# Patient Record
Sex: MALE | Race: WHITE | NOT HISPANIC OR LATINO | Employment: OTHER | URBAN - METROPOLITAN AREA
[De-identification: names, ages, dates, MRNs, and addresses within clinical notes are randomized per-mention and may not be internally consistent; named-entity substitution may affect disease eponyms.]

---

## 2021-01-04 ENCOUNTER — CLINICAL SUPPORT (OUTPATIENT)
Dept: URGENT CARE | Facility: CLINIC | Age: 71
End: 2021-01-04
Payer: MEDICARE

## 2021-01-04 DIAGNOSIS — Z11.9 ENCOUNTER FOR SCREENING EXAMINATION FOR INFECTIOUS DISEASE: Primary | ICD-10-CM

## 2021-01-04 LAB
CTP QC/QA: YES
SARS-COV-2 RDRP RESP QL NAA+PROBE: NEGATIVE

## 2021-01-04 PROCEDURE — 99211 PR OFFICE/OUTPT VISIT, EST, LEVL I: ICD-10-PCS | Mod: S$GLB,,, | Performed by: FAMILY MEDICINE

## 2021-01-04 PROCEDURE — U0002 COVID-19 LAB TEST NON-CDC: HCPCS | Mod: QW,CR,S$GLB, | Performed by: FAMILY MEDICINE

## 2021-01-04 PROCEDURE — 99211 OFF/OP EST MAY X REQ PHY/QHP: CPT | Mod: S$GLB,,, | Performed by: FAMILY MEDICINE

## 2021-01-04 PROCEDURE — U0002: ICD-10-PCS | Mod: QW,CR,S$GLB, | Performed by: FAMILY MEDICINE

## 2021-01-12 ENCOUNTER — IMMUNIZATION (OUTPATIENT)
Dept: INTERNAL MEDICINE | Facility: CLINIC | Age: 71
End: 2021-01-12
Payer: MEDICARE

## 2021-01-12 DIAGNOSIS — Z23 NEED FOR VACCINATION: ICD-10-CM

## 2021-01-12 PROCEDURE — 91300 COVID-19, MRNA, LNP-S, PF, 30 MCG/0.3 ML DOSE VACCINE: CPT | Mod: PBBFAC | Performed by: INTERNAL MEDICINE

## 2021-02-02 ENCOUNTER — IMMUNIZATION (OUTPATIENT)
Dept: INTERNAL MEDICINE | Facility: CLINIC | Age: 71
End: 2021-02-02
Payer: MEDICARE

## 2021-02-02 DIAGNOSIS — Z23 NEED FOR VACCINATION: Primary | ICD-10-CM

## 2021-02-02 PROCEDURE — 91300 COVID-19, MRNA, LNP-S, PF, 30 MCG/0.3 ML DOSE VACCINE: CPT | Mod: PBBFAC | Performed by: INTERNAL MEDICINE

## 2021-02-02 PROCEDURE — 0002A COVID-19, MRNA, LNP-S, PF, 30 MCG/0.3 ML DOSE VACCINE: CPT | Mod: PBBFAC | Performed by: INTERNAL MEDICINE

## 2021-03-18 ENCOUNTER — PATIENT MESSAGE (OUTPATIENT)
Dept: RESEARCH | Facility: HOSPITAL | Age: 71
End: 2021-03-18

## 2021-03-26 ENCOUNTER — PATIENT MESSAGE (OUTPATIENT)
Dept: RESEARCH | Facility: HOSPITAL | Age: 71
End: 2021-03-26

## 2021-03-26 ENCOUNTER — HOSPITAL ENCOUNTER (EMERGENCY)
Facility: HOSPITAL | Age: 71
Discharge: HOME | End: 2021-03-26
Attending: EMERGENCY MEDICINE
Payer: MEDICARE

## 2021-03-26 ENCOUNTER — OFFICE VISIT (OUTPATIENT)
Dept: URGENT CARE | Facility: CLINIC | Age: 71
End: 2021-03-26
Payer: MEDICARE

## 2021-03-26 VITALS
HEART RATE: 69 BPM | BODY MASS INDEX: 28.04 KG/M2 | HEIGHT: 68 IN | SYSTOLIC BLOOD PRESSURE: 124 MMHG | TEMPERATURE: 98 F | WEIGHT: 185 LBS | DIASTOLIC BLOOD PRESSURE: 62 MMHG | RESPIRATION RATE: 18 BRPM | OXYGEN SATURATION: 99 %

## 2021-03-26 VITALS
TEMPERATURE: 98 F | DIASTOLIC BLOOD PRESSURE: 63 MMHG | BODY MASS INDEX: 27.4 KG/M2 | HEART RATE: 68 BPM | HEIGHT: 69 IN | OXYGEN SATURATION: 97 % | WEIGHT: 185 LBS | RESPIRATION RATE: 13 BRPM | SYSTOLIC BLOOD PRESSURE: 96 MMHG

## 2021-03-26 DIAGNOSIS — R52 BODY ACHES: ICD-10-CM

## 2021-03-26 DIAGNOSIS — R53.83 FATIGUE, UNSPECIFIED TYPE: ICD-10-CM

## 2021-03-26 DIAGNOSIS — R39.15 URGENCY OF URINATION: ICD-10-CM

## 2021-03-26 DIAGNOSIS — N39.0 COMPLICATED UTI (URINARY TRACT INFECTION): Primary | ICD-10-CM

## 2021-03-26 DIAGNOSIS — R68.83 CHILLS: ICD-10-CM

## 2021-03-26 DIAGNOSIS — R06.02 SOB (SHORTNESS OF BREATH): Primary | ICD-10-CM

## 2021-03-26 PROBLEM — M16.12 OSTEOARTHRITIS OF LEFT HIP: Status: ACTIVE | Noted: 2020-05-12

## 2021-03-26 PROBLEM — G47.33 OBSTRUCTIVE SLEEP APNEA SYNDROME: Status: ACTIVE | Noted: 2021-03-26

## 2021-03-26 PROBLEM — I10 HYPERTENSION: Status: ACTIVE | Noted: 2021-03-26

## 2021-03-26 PROBLEM — Z78.9 STATIN INTOLERANCE: Status: ACTIVE | Noted: 2021-03-26

## 2021-03-26 PROBLEM — M48.062 LUMBAR STENOSIS WITH NEUROGENIC CLAUDICATION: Status: ACTIVE | Noted: 2019-09-09

## 2021-03-26 PROBLEM — I65.29 CAROTID ATHEROSCLEROSIS: Status: ACTIVE | Noted: 2021-03-26

## 2021-03-26 PROBLEM — I35.0 AORTIC VALVE STENOSIS: Status: ACTIVE | Noted: 2021-03-26

## 2021-03-26 PROBLEM — Z98.1 HX OF FUSION OF CERVICAL SPINE: Status: ACTIVE | Noted: 2021-03-26

## 2021-03-26 PROBLEM — R74.8 ELEVATED CREATINE KINASE LEVEL: Status: ACTIVE | Noted: 2021-03-26

## 2021-03-26 PROBLEM — I48.3 TYPICAL ATRIAL FLUTTER: Status: ACTIVE | Noted: 2021-03-26

## 2021-03-26 PROBLEM — E78.5 HYPERLIPIDEMIA: Status: ACTIVE | Noted: 2021-03-26

## 2021-03-26 PROBLEM — M10.9 GOUT: Status: ACTIVE | Noted: 2021-03-26

## 2021-03-26 PROBLEM — Z82.49 FAMILY HISTORY OF CORONARY ARTERY DISEASE: Status: ACTIVE | Noted: 2021-03-26

## 2021-03-26 PROBLEM — I47.29 NONSUSTAINED VENTRICULAR TACHYCARDIA: Status: ACTIVE | Noted: 2021-03-26

## 2021-03-26 LAB
ALBUMIN SERPL BCP-MCNC: 3.5 G/DL (ref 3.5–5.2)
ALP SERPL-CCNC: 91 U/L (ref 55–135)
ALT SERPL W/O P-5'-P-CCNC: 27 U/L (ref 10–44)
ANION GAP SERPL CALC-SCNC: 10 MMOL/L (ref 8–16)
AST SERPL-CCNC: 30 U/L (ref 10–40)
BACTERIA #/AREA URNS AUTO: ABNORMAL /HPF
BASOPHILS # BLD AUTO: 0.01 K/UL (ref 0–0.2)
BASOPHILS NFR BLD: 0.1 % (ref 0–1.9)
BILIRUB SERPL-MCNC: 0.9 MG/DL (ref 0.1–1)
BILIRUB UR QL STRIP: NEGATIVE
BILIRUB UR QL STRIP: NEGATIVE
BUN SERPL-MCNC: 21 MG/DL (ref 6–30)
BUN SERPL-MCNC: 22 MG/DL (ref 8–23)
CALCIUM SERPL-MCNC: 8.7 MG/DL (ref 8.7–10.5)
CHLORIDE SERPL-SCNC: 94 MMOL/L (ref 95–110)
CHLORIDE SERPL-SCNC: 97 MMOL/L (ref 95–110)
CLARITY UR REFRACT.AUTO: ABNORMAL
CO2 SERPL-SCNC: 26 MMOL/L (ref 23–29)
COLOR UR AUTO: YELLOW
CREAT SERPL-MCNC: 0.9 MG/DL (ref 0.5–1.4)
CREAT SERPL-MCNC: 0.9 MG/DL (ref 0.5–1.4)
CTP QC/QA: YES
CTP QC/QA: YES
DIFFERENTIAL METHOD: ABNORMAL
EOSINOPHIL # BLD AUTO: 0.1 K/UL (ref 0–0.5)
EOSINOPHIL NFR BLD: 1 % (ref 0–8)
ERYTHROCYTE [DISTWIDTH] IN BLOOD BY AUTOMATED COUNT: 12.9 % (ref 11.5–14.5)
EST. GFR  (AFRICAN AMERICAN): >60 ML/MIN/1.73 M^2
EST. GFR  (NON AFRICAN AMERICAN): >60 ML/MIN/1.73 M^2
GLUCOSE SERPL-MCNC: 105 MG/DL (ref 70–110)
GLUCOSE SERPL-MCNC: 106 MG/DL (ref 70–110)
GLUCOSE UR QL STRIP: NEGATIVE
GLUCOSE UR QL STRIP: NEGATIVE
HCT VFR BLD AUTO: 35 % (ref 40–54)
HCT VFR BLD CALC: 35 %PCV (ref 36–54)
HCV AB SERPL QL IA: NEGATIVE
HGB BLD-MCNC: 12 G/DL (ref 14–18)
HGB UR QL STRIP: NEGATIVE
HYALINE CASTS UR QL AUTO: 2 /LPF
IMM GRANULOCYTES # BLD AUTO: 0.02 K/UL (ref 0–0.04)
IMM GRANULOCYTES NFR BLD AUTO: 0.3 % (ref 0–0.5)
KETONES UR QL STRIP: NEGATIVE
KETONES UR QL STRIP: NEGATIVE
LEUKOCYTE ESTERASE UR QL STRIP: ABNORMAL
LEUKOCYTE ESTERASE UR QL STRIP: POSITIVE
LYMPHOCYTES # BLD AUTO: 0.4 K/UL (ref 1–4.8)
LYMPHOCYTES NFR BLD: 5.8 % (ref 18–48)
MCH RBC QN AUTO: 29.7 PG (ref 27–31)
MCHC RBC AUTO-ENTMCNC: 34.3 G/DL (ref 32–36)
MCV RBC AUTO: 87 FL (ref 82–98)
MICROSCOPIC COMMENT: ABNORMAL
MONOCYTES # BLD AUTO: 0.6 K/UL (ref 0.3–1)
MONOCYTES NFR BLD: 8.7 % (ref 4–15)
NEUTROPHILS # BLD AUTO: 5.8 K/UL (ref 1.8–7.7)
NEUTROPHILS NFR BLD: 84.1 % (ref 38–73)
NITRITE UR QL STRIP: NEGATIVE
NON-SQ EPI CELLS #/AREA URNS AUTO: 4 /HPF
NRBC BLD-RTO: 0 /100 WBC
PH UR STRIP: 6 [PH] (ref 5–8)
PH, POC UA: 5 (ref 5–8)
PLATELET # BLD AUTO: 120 K/UL (ref 150–350)
PMV BLD AUTO: 9.8 FL (ref 9.2–12.9)
POC BLOOD, URINE: NEGATIVE
POC IONIZED CALCIUM: 1.09 MMOL/L (ref 1.06–1.42)
POC MOLECULAR INFLUENZA A AGN: NEGATIVE
POC MOLECULAR INFLUENZA B AGN: NEGATIVE
POC NITRATES, URINE: NEGATIVE
POC TCO2 (MEASURED): 27 MMOL/L (ref 23–29)
POTASSIUM BLD-SCNC: 3.6 MMOL/L (ref 3.5–5.1)
POTASSIUM SERPL-SCNC: 3.7 MMOL/L (ref 3.5–5.1)
PROT SERPL-MCNC: 7 G/DL (ref 6–8.4)
PROT UR QL STRIP: ABNORMAL
PROT UR QL STRIP: NEGATIVE
RBC # BLD AUTO: 4.04 M/UL (ref 4.6–6.2)
RBC #/AREA URNS AUTO: 13 /HPF (ref 0–4)
SAMPLE: ABNORMAL
SARS-COV-2 RDRP RESP QL NAA+PROBE: NEGATIVE
SODIUM BLD-SCNC: 133 MMOL/L (ref 136–145)
SODIUM SERPL-SCNC: 133 MMOL/L (ref 136–145)
SP GR UR STRIP: 1.01 (ref 1–1.03)
SP GR UR STRIP: 1.02 (ref 1–1.03)
URN SPEC COLLECT METH UR: ABNORMAL
UROBILINOGEN UR STRIP-ACNC: NORMAL (ref 0.3–2.2)
WBC # BLD AUTO: 6.93 K/UL (ref 3.9–12.7)
WBC #/AREA URNS AUTO: >100 /HPF (ref 0–5)

## 2021-03-26 PROCEDURE — 81003 URINALYSIS AUTO W/O SCOPE: CPT | Mod: QW,S$GLB,, | Performed by: PHYSICIAN ASSISTANT

## 2021-03-26 PROCEDURE — 99284 EMERGENCY DEPT VISIT MOD MDM: CPT | Mod: 25

## 2021-03-26 PROCEDURE — 87502 INFLUENZA DNA AMP PROBE: CPT | Mod: QW,S$GLB,, | Performed by: PHYSICIAN ASSISTANT

## 2021-03-26 PROCEDURE — 87077 CULTURE AEROBIC IDENTIFY: CPT | Performed by: EMERGENCY MEDICINE

## 2021-03-26 PROCEDURE — 87502 POCT INFLUENZA A/B MOLECULAR: ICD-10-PCS | Mod: QW,S$GLB,, | Performed by: PHYSICIAN ASSISTANT

## 2021-03-26 PROCEDURE — 99214 PR OFFICE/OUTPT VISIT, EST, LEVL IV, 30-39 MIN: ICD-10-PCS | Mod: 25,S$GLB,, | Performed by: PHYSICIAN ASSISTANT

## 2021-03-26 PROCEDURE — 71046 XR CHEST PA AND LATERAL: ICD-10-PCS | Mod: S$GLB,,, | Performed by: RADIOLOGY

## 2021-03-26 PROCEDURE — 81001 URINALYSIS AUTO W/SCOPE: CPT | Performed by: EMERGENCY MEDICINE

## 2021-03-26 PROCEDURE — 86803 HEPATITIS C AB TEST: CPT | Performed by: EMERGENCY MEDICINE

## 2021-03-26 PROCEDURE — 99284 PR EMERGENCY DEPT VISIT,LEVEL IV: ICD-10-PCS | Mod: ,,, | Performed by: EMERGENCY MEDICINE

## 2021-03-26 PROCEDURE — 63600175 PHARM REV CODE 636 W HCPCS: Performed by: EMERGENCY MEDICINE

## 2021-03-26 PROCEDURE — 85025 COMPLETE CBC W/AUTO DIFF WBC: CPT | Performed by: EMERGENCY MEDICINE

## 2021-03-26 PROCEDURE — 99284 EMERGENCY DEPT VISIT MOD MDM: CPT | Mod: ,,, | Performed by: EMERGENCY MEDICINE

## 2021-03-26 PROCEDURE — 87088 URINE BACTERIA CULTURE: CPT | Performed by: EMERGENCY MEDICINE

## 2021-03-26 PROCEDURE — 87186 SC STD MICRODIL/AGAR DIL: CPT | Performed by: EMERGENCY MEDICINE

## 2021-03-26 PROCEDURE — 81003 POCT URINALYSIS, DIPSTICK, AUTOMATED, W/O SCOPE: ICD-10-PCS | Mod: QW,S$GLB,, | Performed by: PHYSICIAN ASSISTANT

## 2021-03-26 PROCEDURE — U0002 COVID-19 LAB TEST NON-CDC: HCPCS | Mod: QW,CR,S$GLB, | Performed by: PHYSICIAN ASSISTANT

## 2021-03-26 PROCEDURE — 87086 URINE CULTURE/COLONY COUNT: CPT | Performed by: EMERGENCY MEDICINE

## 2021-03-26 PROCEDURE — 82330 ASSAY OF CALCIUM: CPT

## 2021-03-26 PROCEDURE — 80053 COMPREHEN METABOLIC PANEL: CPT | Performed by: EMERGENCY MEDICINE

## 2021-03-26 PROCEDURE — 96374 THER/PROPH/DIAG INJ IV PUSH: CPT

## 2021-03-26 PROCEDURE — 71046 X-RAY EXAM CHEST 2 VIEWS: CPT | Mod: S$GLB,,, | Performed by: RADIOLOGY

## 2021-03-26 PROCEDURE — U0002: ICD-10-PCS | Mod: QW,CR,S$GLB, | Performed by: PHYSICIAN ASSISTANT

## 2021-03-26 PROCEDURE — 80047 BASIC METABLC PNL IONIZED CA: CPT

## 2021-03-26 PROCEDURE — 99214 OFFICE O/P EST MOD 30 MIN: CPT | Mod: 25,S$GLB,, | Performed by: PHYSICIAN ASSISTANT

## 2021-03-26 RX ORDER — TERAZOSIN 5 MG/1
5 CAPSULE ORAL NIGHTLY
COMMUNITY

## 2021-03-26 RX ORDER — ALFUZOSIN HYDROCHLORIDE 10 MG/1
TABLET, EXTENDED RELEASE ORAL
COMMUNITY
Start: 2021-03-10

## 2021-03-26 RX ORDER — NEBIVOLOL 5 MG/1
10 TABLET ORAL DAILY
COMMUNITY

## 2021-03-26 RX ORDER — CEFTRIAXONE 1 G/1
1 INJECTION, POWDER, FOR SOLUTION INTRAMUSCULAR; INTRAVENOUS
Status: COMPLETED | OUTPATIENT
Start: 2021-03-26 | End: 2021-03-26

## 2021-03-26 RX ORDER — CEFDINIR 300 MG/1
300 CAPSULE ORAL 2 TIMES DAILY
Qty: 28 CAPSULE | Refills: 0 | Status: SHIPPED | OUTPATIENT
Start: 2021-03-26 | End: 2021-04-09

## 2021-03-26 RX ORDER — LOSARTAN POTASSIUM 25 MG/1
TABLET ORAL
COMMUNITY

## 2021-03-26 RX ORDER — AMLODIPINE BESYLATE 10 MG/1
10 TABLET ORAL
COMMUNITY
Start: 2019-09-30

## 2021-03-26 RX ADMIN — CEFTRIAXONE 1 G: 1 INJECTION, POWDER, FOR SOLUTION INTRAMUSCULAR; INTRAVENOUS at 02:03

## 2021-03-29 LAB — BACTERIA UR CULT: ABNORMAL

## 2022-02-22 ENCOUNTER — TELEPHONE (OUTPATIENT)
Dept: NEUROLOGY | Facility: CLINIC | Age: 72
End: 2022-02-22
Payer: MEDICARE

## 2022-02-22 NOTE — TELEPHONE ENCOUNTER
----- Message from Liliam Diaz sent at 2/22/2022 12:43 PM CST -----  Regarding: schedule  Contact: [phone]  Request Appt      Appt Type: Movement   Call Back Number: [phone]  Additional Information:

## 2022-02-23 ENCOUNTER — TELEPHONE (OUTPATIENT)
Dept: NEUROLOGY | Facility: CLINIC | Age: 72
End: 2022-02-23
Payer: MEDICARE

## 2022-02-23 NOTE — TELEPHONE ENCOUNTER
----- Message from Rashida Mathias sent at 2/23/2022 10:39 AM CST -----  Regarding: scheduling  Contact: pt daughter  Pt daughter requesting a call back in regards to scheduling an appt.    Pt angela Flaherty @ [phone]

## 2022-02-25 ENCOUNTER — TELEPHONE (OUTPATIENT)
Dept: NEUROLOGY | Facility: CLINIC | Age: 72
End: 2022-02-25
Payer: MEDICARE

## 2022-02-25 NOTE — TELEPHONE ENCOUNTER
----- Message from Anne Talbert sent at 2/25/2022  2:11 PM CST -----  Regarding: appt  Contact: pt @ [phone]  Pt calling to schedule an appt with Dr. Ahuja, patient was recently diagnosed with Parkinson's while in [place]. Please call to schedule.

## 2022-02-25 NOTE — TELEPHONE ENCOUNTER
Spoke to patient informed him the appointment will be scheduled in June when the schedule opens. Will place appointment on wait list.

## 2022-03-10 ENCOUNTER — TELEPHONE (OUTPATIENT)
Dept: NEUROLOGY | Facility: CLINIC | Age: 72
End: 2022-03-10
Payer: MEDICARE

## 2022-03-10 NOTE — TELEPHONE ENCOUNTER
----- Message from Jeb Mckeon sent at 3/10/2022 11:44 AM CST -----  Patient called to speak w/ someone in regards to scheduling a NP appt for parkinson's related treatment. Requesting callback [phone]

## 2022-04-19 ENCOUNTER — TELEPHONE (OUTPATIENT)
Dept: NEUROLOGY | Facility: CLINIC | Age: 72
End: 2022-04-19
Payer: MEDICARE

## 2022-04-19 NOTE — TELEPHONE ENCOUNTER
----- Message from Matilde Black sent at 4/19/2022  5:20 PM CDT -----  Needs advice from nurse:      Who Called:pt  Regarding:returning a call  Would the patient rather a call back or VIA [facility]?  Best Call Back number:[phone]  Additional Info:

## 2022-04-19 NOTE — TELEPHONE ENCOUNTER
Called to inform patient of the canceling of the virtual appointment as it was scheduled incorrectly. Patient needs to be seen in person. No answer voicemail left.

## 2022-04-20 ENCOUNTER — TELEPHONE (OUTPATIENT)
Dept: NEUROLOGY | Facility: CLINIC | Age: 72
End: 2022-04-20
Payer: MEDICARE

## 2022-04-20 NOTE — TELEPHONE ENCOUNTER
Spoke to patient informed him I will be in touch when the decision is made for in person or virtual.

## 2022-04-20 NOTE — TELEPHONE ENCOUNTER
----- Message from Bety Richey sent at 4/20/2022  4:19 PM CDT -----  Regarding: Appointment  Contact: [phone]  Calling to rescheduled appointment on 5/5/22 to a in person visit. Please call as patient has called previously with no response.

## 2022-04-25 ENCOUNTER — TELEPHONE (OUTPATIENT)
Dept: NEUROLOGY | Facility: CLINIC | Age: 72
End: 2022-04-25

## 2022-04-26 ENCOUNTER — TELEPHONE (OUTPATIENT)
Dept: NEUROLOGY | Facility: CLINIC | Age: 72
End: 2022-04-26
Payer: MEDICARE

## 2022-04-26 NOTE — TELEPHONE ENCOUNTER
----- Message from Anai Burgos sent at 4/26/2022 12:49 PM CDT -----  Contact: pt  Pt requesting call back , needs to schedule appt with Dr on either 5-3, 4, or 6 had a virtual but Dr wanted to see him in person . Please call         Confirmed patient's contact info below:  Contact Name: Ulisses Ponce  Phone Number: [phone]

## 2022-04-26 NOTE — TELEPHONE ENCOUNTER
----- Message from Carmen Sultana sent at 4/26/2022  4:24 PM CDT -----  Regarding: pt called  Name of Who is Calling:GUI SNOW [[id number]]       What is the request in detail: pt is requesting  a call back from the office to set up his appt . Please advise      Can the clinic reply by [facility]: No      What Number to Call Back if not in [facility]: [phone] or [phone]

## 2022-04-26 NOTE — TELEPHONE ENCOUNTER
----- Message from Miguelangel Navarro sent at 4/26/2022  4:02 PM CDT -----  Who Called: GUI SNOW     What is the request in detail: Pt called in to reschedule appt. Please advise.     Can the clinic reply by [facility]?    Best Call Back Number: [phone]    Additional Information:

## 2022-04-26 NOTE — TELEPHONE ENCOUNTER
----- Message from Tanisha Dahl sent at 4/26/2022  2:37 PM CDT -----  Regarding: Appt Request  Pt called about scheduling an appt pt states he has left several messages about r/s.     Requesting Call back: [phone] or [phone]

## 2022-04-27 NOTE — TELEPHONE ENCOUNTER
Left detailed message for patient offering new patient appt with ARIANNA on 6/29/22. Or RR on 5/16/22.

## 2022-04-28 ENCOUNTER — TELEPHONE (OUTPATIENT)
Dept: NEUROLOGY | Facility: CLINIC | Age: 72
End: 2022-04-28
Payer: MEDICARE

## 2022-04-28 NOTE — TELEPHONE ENCOUNTER
----- Message from Sharmin Capone sent at 4/28/2022  9:50 AM CDT -----  Regarding: appt  Contact: pt @ [phone]  Pt is calling to get appt, Kalee stated that she was calling pt back, pt stated the she still have not call to give him appt. Asking for a call back please

## 2022-04-28 NOTE — TELEPHONE ENCOUNTER
New patient appt scheduled with [facility] 06/29/22 at 3:40 PM. Confirmed time and location with patient. Mailing appt slip.

## 2022-06-29 ENCOUNTER — LAB VISIT (OUTPATIENT)
Dept: LAB | Facility: HOSPITAL | Age: 72
End: 2022-06-29
Payer: MEDICARE

## 2022-06-29 ENCOUNTER — OFFICE VISIT (OUTPATIENT)
Dept: NEUROLOGY | Facility: CLINIC | Age: 72
End: 2022-06-29
Payer: MEDICARE

## 2022-06-29 VITALS
BODY MASS INDEX: 26.29 KG/M2 | WEIGHT: 177.5 LBS | SYSTOLIC BLOOD PRESSURE: 106 MMHG | HEIGHT: 69 IN | HEART RATE: 69 BPM | DIASTOLIC BLOOD PRESSURE: 67 MMHG

## 2022-06-29 DIAGNOSIS — Z71.89 COUNSELING REGARDING GOALS OF CARE: ICD-10-CM

## 2022-06-29 DIAGNOSIS — R41.3 MEMORY CHANGE: ICD-10-CM

## 2022-06-29 DIAGNOSIS — G60.9 HEREDITARY AND IDIOPATHIC NEUROPATHY, UNSPECIFIED: ICD-10-CM

## 2022-06-29 DIAGNOSIS — G20.A1 PD (PARKINSON'S DISEASE): Primary | ICD-10-CM

## 2022-06-29 DIAGNOSIS — K59.00 CONSTIPATION, UNSPECIFIED CONSTIPATION TYPE: ICD-10-CM

## 2022-06-29 DIAGNOSIS — G62.9 PERIPHERAL POLYNEUROPATHY: ICD-10-CM

## 2022-06-29 PROCEDURE — 99999 PR PBB SHADOW E&M-EST. PATIENT-LVL III: ICD-10-PCS | Mod: PBBFAC,,, | Performed by: PSYCHIATRY & NEUROLOGY

## 2022-06-29 PROCEDURE — 99205 PR OFFICE/OUTPT VISIT, NEW, LEVL V, 60-74 MIN: ICD-10-PCS | Mod: S$PBB,,, | Performed by: PSYCHIATRY & NEUROLOGY

## 2022-06-29 PROCEDURE — 36415 COLL VENOUS BLD VENIPUNCTURE: CPT | Performed by: PHYSICIAN ASSISTANT

## 2022-06-29 PROCEDURE — 84207 ASSAY OF VITAMIN B-6: CPT | Performed by: PHYSICIAN ASSISTANT

## 2022-06-29 PROCEDURE — 99999 PR PBB SHADOW E&M-EST. PATIENT-LVL III: CPT | Mod: PBBFAC,,, | Performed by: PSYCHIATRY & NEUROLOGY

## 2022-06-29 PROCEDURE — 86592 SYPHILIS TEST NON-TREP QUAL: CPT | Performed by: PHYSICIAN ASSISTANT

## 2022-06-29 PROCEDURE — 99205 OFFICE O/P NEW HI 60 MIN: CPT | Mod: S$PBB,,, | Performed by: PSYCHIATRY & NEUROLOGY

## 2022-06-29 PROCEDURE — 99213 OFFICE O/P EST LOW 20 MIN: CPT | Mod: PBBFAC | Performed by: PSYCHIATRY & NEUROLOGY

## 2022-06-29 PROCEDURE — 84425 ASSAY OF VITAMIN B-1: CPT | Performed by: PHYSICIAN ASSISTANT

## 2022-06-29 RX ORDER — TRAZODONE HYDROCHLORIDE 100 MG/1
100 TABLET ORAL NIGHTLY
COMMUNITY
End: 2023-02-02 | Stop reason: SDUPTHER

## 2022-06-29 RX ORDER — CARBIDOPA AND LEVODOPA 25; 100 MG/1; MG/1
2 TABLET ORAL 3 TIMES DAILY
COMMUNITY
End: 2023-08-30 | Stop reason: SDUPTHER

## 2022-06-29 RX ORDER — RASAGILINE 1 MG/1
1 TABLET ORAL DAILY
Qty: 30 TABLET | Refills: 11 | Status: SHIPPED | OUTPATIENT
Start: 2022-06-29 | End: 2023-05-29 | Stop reason: SDUPTHER

## 2022-06-29 NOTE — PROGRESS NOTES
Name: Ulisses Ponce  MRN: [id number]   CSN: [id number]      Date: 06/29/2022    Referring physician:  Carole Self  No address on file    Chief Complaint: PD eval       History of Present Illness (HPI): Ulisses Ponce is a L HANDED 71 y.o. male with a medical issues significant for hx of cervical fusion, HLD, HTN, lumbar stenosis with neurogenic claudication and KARRI who presents for PD eval. Previously followed by Dr. Coley, on cd/ld 25/100 2 tabs TID. Had [facility]nd opinion with [facility] in [place] in Feb 2022.  Accompanied by wife and daughter. He had previously attributed stiffness to cervical and lumbar DDD. For many years he has had stiffness. It wasn't until memory changes started that he went to the neurologist. He first saw Dr. Leone in Fall 2021. Diagnosed with Parkinson's in January 2022. They were told that he had significant retropulsion and were concerned for NPH. Harder for him to get dressed, get his shoes on. R side is stiffer. Slower speed, stride length has shortened as well. He has not noticed any improvement with the cd/ld. Takes it 6, noon and 6 pm. Makes him feel a little dizzy. No improvement in movements. No falls. Shuffles his feet. Fatigues easily. Tries to stay active, doing PT twice a week and exercises twice weekly at his house as well. Did Big and Loud with [employer]. Has some swaying movements whenever he goes from sitting to standing.   Denies hallucinations/delusions or paranoia. Takes trazodone for sleep which helps. Some difficulty getting out of bed.     Last dose of cd/ld was at noon today. Currently supplementing with B12.     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    IMPRESSION: H&Y Stage III Idiopathic Parkinson's Disease    He presents with his wife having flown in from [place] to visit their daughter who lives in [place]. He saw Dr. Neely yesterday for second opinion on diagnosis of Parkinson Disease. He presents today with me for the Parkinson disease annual Interdisciplinary Clinic at [facility] which includes evaluation from PT, OT, ST. Please see therapy evaluations and recommendations in plan that were fully reviewed with the patient today. In compliance with AAN guidelines, today was the patient's yearly evaluation where the patient was evaluated by PT, OT, and ST, and the patient was assessed for comorbid psychiatric symptoms (completed the HADS-see media tab), cognitive impairment (with appropriate screening test of a MOCA documented below), autonomic dysfunction, sleep disturbances, and fall rate.     MOCA today was 24/30. Orthostatic blood pressures today were positive.    Last year he noticed some minor STM difficulty as well as some gait difficulty, rigidity, and difficulty with getting out of bed. They deny evidence of dream enactment behaviors but he reports loosing his sense of smell 5-10 years ago. He has also dealt with intermittent constipation for years but now uses fiber and stool softeners. Occasionally feels lightheaded on standing but denies significant bladder dysfunction. He denies antipsychotic exposures and there is no relevant family history. No significant hallucinations, staring spells, cognitive fluctuations.  The neurologic examination is notable for parkinsonism given bilateral but slightly asymmetric (R>L) bradykinesia, rigidity and gait changes.   There were no red flag findings by history or on examination concerning for a Parkinson's plus syndrome. Given this, age of onset and gradual progression, this most likely represents idiopathic  Parkinson's disease. He underwent a recent DaTscan which by report showed findings consistent with parkinsonism.  He was somewhat recently started on carbidopa-levodopa  mg IR 1 tablet, 3 times a day and notes no significant subjective benefit. Based on his examination, he would likely benefit from an increased dose of carbidopa-levodopa. He increased from 1 tab TID to 2 tabs TID and noticed a little nausea and dizziness. We discussed that this may have been too quick of an increase and recommend going to 1.5 tabs TID for a week then if tolerating ok can go to 2 tabs TID. If any SE occur, may need to increase even slower and they can let us know.     Nonmotor symptoms include mild cognitive impairment, orthostatic hypotension, and hypophonia. We discussed all of these in detail as below.  We also spent time reviewing his medications. He has an extensive list of supplements that he takes, many of which I have never heard before. We discussed that the only supplements that we recommend with Parkinson's disease are B12, folic acid, and frequently vitamin D depending on lab numbers. We will get labs today to assess these values. The rest of his supplements I recommend he review with his primary care provider or his nutritional list to see which of he can get off of as he likely does not need all of these.      Nonmotor/Premotor ROS:  Anosmia: lost sense of smell 2-3 years  Dysarthria/Hypophonia: softening of his voice, a bit raspy  Dysphagia/Sialorrhea: some difficulty with swallowing pills, no sialorrhea   Depression: none   Cognitive slowing: short term memory issues, loses train of thought   Urinary changes: some leakage, 2/2 BPH   Constipation: chronic constipation, takes OTC supplements, eat prunes   Orthostasis: some with cd/ld   Falls: none   Freezing: none   Micrographia: normal   Sleep issues:  -RBD: none       Review of Systems:   Review of Systems   Constitutional: Negative for chills, fever and  "malaise/fatigue.   HENT: Negative for hearing loss.    Eyes: Negative for blurred vision and double vision.   Respiratory: Negative for cough, shortness of breath and stridor.    Cardiovascular: Negative for chest pain and leg swelling.   Gastrointestinal: Positive for constipation. Negative for diarrhea and nausea.   Genitourinary: Negative for frequency and urgency.   Musculoskeletal: Negative for falls.   Skin: Negative for itching and rash.   Neurological: Negative for dizziness, tremors, loss of consciousness and weakness.   Psychiatric/Behavioral: Positive for memory loss. Negative for hallucinations.           Past Medical History: The patient  has a past medical history of Hypertension.    Social History: The patient  reports that he has never smoked. He has never used smokeless tobacco. He reports current alcohol use. He reports previous drug use.    Family History: Their family history is not on file.    Allergies: Sulfa (sulfonamide antibiotics)     Meds:   Current Outpatient Medications on File Prior to Visit   Medication Sig Dispense Refill    alfuzosin (UROXATRAL) 10 mg Tb24       amLODIPine (NORVASC) 10 MG tablet Take 10 mg by mouth.      apixaban (ELIQUIS) 5 mg Tab 2.5 mg 2 (two) times daily.      carbidopa-levodopa  mg (SINEMET)  mg per tablet Take 2 tablets by mouth 3 (three) times daily.      losartan (COZAAR) 25 MG tablet       nebivoloL (BYSTOLIC) 5 MG Tab Take 10 mg by mouth once daily.      terazosin (HYTRIN) 5 MG capsule Take 5 mg by mouth every evening.      traZODone (DESYREL) 100 MG tablet Take 100 mg by mouth every evening. Patient taking 1.5 pills per instance       No current facility-administered medications on file prior to visit.       Exam:  /67 (BP Location: Right arm, Patient Position: Standing, BP Method: Medium (Automatic))   Pulse 69   Ht 5' 9" (1.753 m)   Wt 80.5 kg (177 lb 7.5 oz)   BMI 26.21 kg/m²     Constitutional  Well-developed, " well-nourished, appears stated age   Ophthalmoscopic  No papilledema with no hemorrhages or exudates bilaterally   Cardiovascular  Radial pulses 2+ and symmetric, no LE edema bilaterally   Neurological    * Mental status  MOCA =      - Orientation  Oriented to person, place, time, and situation     - Memory   Intact recent and remote     - Attention/concentration  Attentive, vigilant during exam     - Language  Naming & repetition intact, +2-step commands     - Fund of knowledge  Aware of current events     - Executive  Well-organized thoughts     - Other     * Cranial nerves       - CN II  PERRL, visual fields full to confrontation     - CN III, IV, VI  Extraocular movements full, normal pursuits and saccades     - CN V  Sensation V1 - V3 intact     - CN VII  Face strong and symmetric bilaterally     - CN VIII  Hearing intact bilaterally     - CN IX, X  Palate raises midline and symmetric     - CN XI  SCM and trapezius 5/5 bilaterally     - CN XII  Tongue midline   * Motor  Muscle bulk normal, strength 5/5 throughout   * Sensory   Intact to temperature   * Coordination  No dysmetria with finger-to-nose or heel-to-shin   * Gait  See below.   * Deep tendon reflexes  1+ and symmetric throughout   Babinski downgoing bilaterally   * Specialized movement exam  no hypophonic speech.    mild facial masking.   R > L cogwheel rigidity.     R > L  bradykinesia.   mild postural tremor of R hand, no tremor at rest    No other dystonia, chorea, athetosis, myoclonus, or tics.   No motor impersistence.   Narrow-based gait.   mild shortened stride length.   decreased arm swing in R compared to L but relatively good arm swing in clinic today     mild anterior stoop   3-4 en-bloc turn     3-4 steps to recover from pull-test      Laboratory/Radiological:  - Results:  No visits with results within 3 Month(s) from this visit.   Latest known visit with results is:   Admission on 03/26/2021, Discharged on 03/26/2021   Component Date Value  Ref Range Status    Hepatitis C Ab 03/26/2021 Negative  Negative Final    WBC 03/26/2021 6.93  3.90 - 12.70 K/uL Final    RBC 03/26/2021 4.04 (A) 4.60 - 6.20 M/uL Final    Hemoglobin 03/26/2021 12.0 (A) 14.0 - 18.0 g/dL Final    Hematocrit 03/26/2021 35.0 (A) 40.0 - 54.0 % Final    MCV 03/26/2021 87  82 - 98 fL Final    MCH 03/26/2021 29.7  27.0 - 31.0 pg Final    MCHC 03/26/2021 34.3  32.0 - 36.0 g/dL Final    RDW 03/26/2021 12.9  11.5 - 14.5 % Final    Platelets 03/26/2021 120 (A) 150 - 350 K/uL Final    MPV 03/26/2021 9.8  9.2 - 12.9 fL Final    Immature Granulocytes 03/26/2021 0.3  0.0 - 0.5 % Final    Gran # (ANC) 03/26/2021 5.8  1.8 - 7.7 K/uL Final    Immature Grans (Abs) 03/26/2021 0.02  0.00 - 0.04 K/uL Final    Lymph # 03/26/2021 0.4 (A) 1.0 - 4.8 K/uL Final    Mono # 03/26/2021 0.6  0.3 - 1.0 K/uL Final    Eos # 03/26/2021 0.1  0.0 - 0.5 K/uL Final    Baso # 03/26/2021 0.01  0.00 - 0.20 K/uL Final    nRBC 03/26/2021 0  0 /100 WBC Final    Gran % 03/26/2021 84.1 (A) 38.0 - 73.0 % Final    Lymph % 03/26/2021 5.8 (A) 18.0 - 48.0 % Final    Mono % 03/26/2021 8.7  4.0 - 15.0 % Final    Eosinophil % 03/26/2021 1.0  0.0 - 8.0 % Final    Basophil % 03/26/2021 0.1  0.0 - 1.9 % Final    Differential Method 03/26/2021 Automated   Final    Sodium 03/26/2021 133 (A) 136 - 145 mmol/L Final    Potassium 03/26/2021 3.7  3.5 - 5.1 mmol/L Final    Chloride 03/26/2021 97  95 - 110 mmol/L Final    CO2 03/26/2021 26  23 - 29 mmol/L Final    Glucose 03/26/2021 106  70 - 110 mg/dL Final    BUN 03/26/2021 22  8 - 23 mg/dL Final    Creatinine 03/26/2021 0.9  0.5 - 1.4 mg/dL Final    Calcium 03/26/2021 8.7  8.7 - 10.5 mg/dL Final    Total Protein 03/26/2021 7.0  6.0 - 8.4 g/dL Final    Albumin 03/26/2021 3.5  3.5 - 5.2 g/dL Final    Total Bilirubin 03/26/2021 0.9  0.1 - 1.0 mg/dL Final    Alkaline Phosphatase 03/26/2021 91  55 - 135 U/L Final    AST 03/26/2021 30  10 - 40 U/L Final    ALT  03/26/2021 27  10 - 44 U/L Final    Anion Gap 03/26/2021 10  8 - 16 mmol/L Final    eGFR if African American 03/26/2021 >60.0  >60 mL/min/1.73 m^2 Final    eGFR if non African American 03/26/2021 >60.0  >60 mL/min/1.73 m^2 Final    Specimen UA 03/26/2021 Urine, Clean Catch   Final    Color, UA 03/26/2021 Yellow  Yellow, Straw, Holley Final    Appearance, UA 03/26/2021 Cloudy (A) Clear Final    pH, UA 03/26/2021 6.0  5.0 - 8.0 Final    Specific Gravity, UA 03/26/2021 1.015  1.005 - 1.030 Final    Protein, UA 03/26/2021 1+ (A) Negative Final    Glucose, UA 03/26/2021 Negative  Negative Final    Ketones, UA 03/26/2021 Negative  Negative Final    Bilirubin (UA) 03/26/2021 Negative  Negative Final    Occult Blood UA 03/26/2021 Negative  Negative Final    Nitrite, UA 03/26/2021 Negative  Negative Final    Leukocytes, UA 03/26/2021 3+ (A) Negative Final    POC Glucose 03/26/2021 105  70 - 110 mg/dL Final    POC BUN 03/26/2021 21  6 - 30 mg/dL Final    POC Creatinine 03/26/2021 0.9  0.5 - 1.4 mg/dL Final    POC Sodium 03/26/2021 133 (A) 136 - 145 mmol/L Final    POC Potassium 03/26/2021 3.6  3.5 - 5.1 mmol/L Final    POC Chloride 03/26/2021 94 (A) 95 - 110 mmol/L Final    POC TCO2 (MEASURED) 03/26/2021 27  23 - 29 mmol/L Final    POC Ionized Calcium 03/26/2021 1.09  1.06 - 1.42 mmol/L Final    POC Hematocrit 03/26/2021 35 (A) 36 - 54 %PCV Final    Sample 03/26/2021 MARÍA   Final    RBC, UA 03/26/2021 13 (A) 0 - 4 /hpf Final    WBC, UA 03/26/2021 >100 (A) 0 - 5 /hpf Final    Bacteria 03/26/2021 Many (A) None-Occ /hpf Final    Non-Squam Epith 03/26/2021 4 (A) <1/hpf /hpf Final    Hyaline Casts, UA 03/26/2021 2 (A) 0-1/lpf /lpf Final    Microscopic Comment 03/26/2021 SEE COMMENT   Final    Urine Culture, Routine 03/26/2021  (A)  Final                    Value:KLEBSIELLA AEROGENES  >100,000 cfu/ml         - Independent review of images:  DaTscan from Dec 2021      - Independent review of  consultant's notes:     ASSESSMENT/PLAN:  1. Parkinson's Disease  - typical Parkinson's disease, akinetic rigid, non-motor features of alpha-synucleinopathy such as anosmia and chronic constipation   - currently taking cd/ld 2 tabs TID without subjective benefit   - add rasagiline 1 mg   - continue BIG and LOUD exercises at home       2. Gait instability   - multifactorial, iPD, lumbar spinal stenosis, possible sensory ataxia  - labs as below       3. Insomnia  - continue trazodone       4. Constipation  - continue OTC meds       Orders Placed This Encounter    Vitamin B12 Deficiency Panel    Vitamin B1    RPR    VITAMIN B6    rasagiline (AZILECT) 1 mg Tab           Follow up: in 3 months with [facility]     Collaborating Physician, Dr. Ahuja, was available during today's encounter. Any change to plan along with cosign to appear in the EMR.       Total time spent with the patient: 65 minutes.  50 minutes of face-to-face consultation and 15 minutes of chart review and coordination of care, on the day of the visit. This includes face to face time and non-face to face time preparing to see the patient (eg, review of tests), obtaining and/or reviewing separately obtained history, documenting clinical information in the electronic or other health record, independently interpreting resultsand communicating results to the patient/family/caregiver, or care coordination.         Rylie Tucker PA-C   [facility]  Department of Neurology  Movement Disorders

## 2022-06-30 ENCOUNTER — PATIENT MESSAGE (OUTPATIENT)
Dept: NEUROLOGY | Facility: CLINIC | Age: 72
End: 2022-06-30
Payer: MEDICARE

## 2022-06-30 LAB — RPR SER QL: NORMAL

## 2022-07-02 LAB — VIT B12 SERPL-MCNC: 404 NG/L (ref 180–914)

## 2022-07-05 LAB — VIT B1 BLD-MCNC: 68 UG/L (ref 38–122)

## 2022-07-06 ENCOUNTER — PATIENT MESSAGE (OUTPATIENT)
Dept: NEUROLOGY | Facility: CLINIC | Age: 72
End: 2022-07-06
Payer: MEDICARE

## 2022-07-06 LAB — PYRIDOXAL SERPL-MCNC: 42 UG/L (ref 5–50)

## 2022-07-31 ENCOUNTER — PATIENT MESSAGE (OUTPATIENT)
Dept: NEUROLOGY | Facility: CLINIC | Age: 72
End: 2022-07-31
Payer: MEDICARE

## 2022-08-29 ENCOUNTER — PATIENT MESSAGE (OUTPATIENT)
Dept: NEUROLOGY | Facility: CLINIC | Age: 72
End: 2022-08-29
Payer: MEDICARE

## 2022-09-14 ENCOUNTER — PATIENT MESSAGE (OUTPATIENT)
Dept: NEUROLOGY | Facility: CLINIC | Age: 72
End: 2022-09-14
Payer: MEDICARE

## 2022-09-26 ENCOUNTER — TELEPHONE (OUTPATIENT)
Dept: NEUROLOGY | Facility: CLINIC | Age: 72
End: 2022-09-26
Payer: MEDICARE

## 2022-09-27 ENCOUNTER — PATIENT MESSAGE (OUTPATIENT)
Dept: NEUROLOGY | Facility: CLINIC | Age: 72
End: 2022-09-27

## 2022-09-27 ENCOUNTER — OFFICE VISIT (OUTPATIENT)
Dept: NEUROLOGY | Facility: CLINIC | Age: 72
End: 2022-09-27
Payer: MEDICARE

## 2022-09-27 ENCOUNTER — TELEPHONE (OUTPATIENT)
Dept: NEUROLOGY | Facility: CLINIC | Age: 72
End: 2022-09-27

## 2022-09-27 VITALS
HEIGHT: 69 IN | BODY MASS INDEX: 25.31 KG/M2 | HEART RATE: 66 BPM | WEIGHT: 170.88 LBS | SYSTOLIC BLOOD PRESSURE: 143 MMHG | DIASTOLIC BLOOD PRESSURE: 82 MMHG

## 2022-09-27 DIAGNOSIS — G20.A1 PD (PARKINSON'S DISEASE): Primary | ICD-10-CM

## 2022-09-27 DIAGNOSIS — G62.9 PERIPHERAL POLYNEUROPATHY: ICD-10-CM

## 2022-09-27 DIAGNOSIS — Z71.89 COUNSELING REGARDING GOALS OF CARE: ICD-10-CM

## 2022-09-27 DIAGNOSIS — K59.00 CONSTIPATION, UNSPECIFIED CONSTIPATION TYPE: ICD-10-CM

## 2022-09-27 PROCEDURE — 99215 OFFICE O/P EST HI 40 MIN: CPT | Mod: S$PBB,,, | Performed by: PHYSICIAN ASSISTANT

## 2022-09-27 PROCEDURE — 99999 PR PBB SHADOW E&M-EST. PATIENT-LVL III: CPT | Mod: PBBFAC,,, | Performed by: PHYSICIAN ASSISTANT

## 2022-09-27 PROCEDURE — 99213 OFFICE O/P EST LOW 20 MIN: CPT | Mod: PBBFAC | Performed by: PHYSICIAN ASSISTANT

## 2022-09-27 PROCEDURE — 99999 PR PBB SHADOW E&M-EST. PATIENT-LVL III: ICD-10-PCS | Mod: PBBFAC,,, | Performed by: PHYSICIAN ASSISTANT

## 2022-09-27 PROCEDURE — 99215 PR OFFICE/OUTPT VISIT, EST, LEVL V, 40-54 MIN: ICD-10-PCS | Mod: S$PBB,,, | Performed by: PHYSICIAN ASSISTANT

## 2022-09-27 NOTE — PROGRESS NOTES
Name: Ulisses Ponce  MRN: [id number]   CSN: [id number]      Date: 09/27/2022    Referring physician:  No referring provider defined for this encounter.    Chief Complaint: PD eval     Interval History:  - accompanied by spouse   - added rasagiline 1 mg daily   - he has noticed that this has helped   - he continues to get numbness/tingling in his R leg and R foot -- some in the left foot as well -- this has been ongoing for a year   - worse at the end of the day  - some low back pain, had lumbar fusion many years ago  - some dizziness whenever he goes from sitting to standing, he checks his BP whenever he is seated and its normal   - does not drink enough water a day -- drinks a couple bottles   - constipation is an issue as well   - he has lost some weight ~ 7 lbs since last visit -- just saw PCP, didn't think much of it?  - also having some shortness of breath -- discussed this with PCP as well   - a little bit of loss of appetite but still cleans his plate   - drinks coffee in the morning and then doesn't eat until lunch, hasnt been eating as much protein because of cd/ld   - doing BRIOFIT -- maintenance PT therapy   - does PT 2-3 days a week   - feels better after he exercises, sleeps better as well         From June 2022 Ulisses Ponce is a L HANDED 71 y.o. male with a medical issues significant for hx of cervical fusion, HLD, HTN, lumbar stenosis with neurogenic claudication and KARRI who presents for PD eval. Previously followed by Dr. Coley, on cd/ld 25/100 2 tabs TID. [facility]nd opinion with [facility] [place] in Feb 2022.  Accompanied by wife and daughter. He had previously attributed stiffness to cervical and lumbar DDD. For many years he has had stiffness. It wasn't until memory changes started that he went to the neurologist. He first saw Dr. Leone in Fall 2021. Diagnosed with Parkinson's in January 2022. They were told that he had significant retropulsion and were concerned for  NPH. Harder for him to get dressed, get his shoes on. R side is stiffer. Slower speed, stride length has shortened as well. He has not noticed any improvement with the cd/ld. Takes it 6, noon and 6 pm. Makes him feel a little dizzy. No improvement in movements. No falls. Shuffles his feet. Fatigues easily. Tries to stay active, doing PT twice a week and exercises twice weekly at his house as well. Did Big and Loud with [employer]. Has some swaying movements whenever he goes from sitting to standing.   Denies hallucinations/delusions or paranoia. Takes trazodone for sleep which helps. Some difficulty getting out of bed.     Last dose of cd/ld was at noon today. Currently supplementing with B12.     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    IMPRESSION: H&Y Stage III Idiopathic Parkinson's Disease    He presents with his wife having flown in from [place] to visit their daughter who lives in [place]. He saw Dr. Neely yesterday for second opinion on diagnosis of Parkinson Disease. He presents today with me for the Parkinson disease annual Interdisciplinary Clinic at [facility] which includes evaluation from PT, OT, ST. Please see therapy evaluations and recommendations in plan that were fully reviewed with the patient today. In compliance with AAN guidelines, today was the patient's yearly evaluation where the patient was evaluated by PT, OT, and ST, and the patient was assessed for comorbid psychiatric symptoms (completed the HADS-see media tab), cognitive impairment (with appropriate screening test of a MOCA documented below), autonomic  dysfunction, sleep disturbances, and fall rate.     MOCA today was 24/30. Orthostatic blood pressures today were positive.    Last year he noticed some minor STM difficulty as well as some gait difficulty, rigidity, and difficulty with getting out of bed. They deny evidence of dream enactment behaviors but he reports loosing his sense of smell 5-10 years ago. He has also dealt with intermittent constipation for years but now uses fiber and stool softeners. Occasionally feels lightheaded on standing but denies significant bladder dysfunction. He denies antipsychotic exposures and there is no relevant family history. No significant hallucinations, staring spells, cognitive fluctuations.  The neurologic examination is notable for parkinsonism given bilateral but slightly asymmetric (R>L) bradykinesia, rigidity and gait changes.   There were no red flag findings by history or on examination concerning for a Parkinson's plus syndrome. Given this, age of onset and gradual progression, this most likely represents idiopathic Parkinson's disease. He underwent a recent DaTscan which by report showed findings consistent with parkinsonism.  He was somewhat recently started on carbidopa-levodopa  mg IR 1 tablet, 3 times a day and notes no significant subjective benefit. Based on his examination, he would likely benefit from an increased dose of carbidopa-levodopa. He increased from 1 tab TID to 2 tabs TID and noticed a little nausea and dizziness. We discussed that this may have been too quick of an increase and recommend going to 1.5 tabs TID for a week then if tolerating ok can go to 2 tabs TID. If any SE occur, may need to increase even slower and they can let us know.     Nonmotor symptoms include mild cognitive impairment, orthostatic hypotension, and hypophonia. We discussed all of these in detail as below.  We also spent time reviewing his medications. He has an extensive list of supplements that he takes, many of  which I have never heard before. We discussed that the only supplements that we recommend with Parkinson's disease are B12, folic acid, and frequently vitamin D depending on lab numbers. We will get labs today to assess these values. The rest of his supplements I recommend he review with his primary care provider or his nutritional list to see which of he can get off of as he likely does not need all of these.      Nonmotor/Premotor ROS:  Anosmia: lost sense of smell 2-3 years  Dysarthria/Hypophonia: softening of his voice, a bit raspy  Dysphagia/Sialorrhea: some difficulty with swallowing pills, no sialorrhea   Depression: none   Cognitive slowing: short term memory issues, loses train of thought   Urinary changes: some leakage, 2/2 BPH   Constipation: chronic constipation, takes OTC supplements, eat prunes   Orthostasis: some with cd/ld   Falls: none   Freezing: none   Micrographia: normal   Sleep issues:  -RBD: none       Review of Systems:   Review of Systems   Constitutional:  Negative for chills, fever and malaise/fatigue.   HENT:  Negative for hearing loss.    Eyes:  Negative for blurred vision and double vision.   Respiratory:  Negative for cough, shortness of breath and stridor.    Cardiovascular:  Negative for chest pain and leg swelling.   Gastrointestinal:  Positive for constipation. Negative for diarrhea and nausea.   Genitourinary:  Negative for frequency and urgency.   Musculoskeletal:  Negative for falls.   Skin:  Negative for itching and rash.   Neurological:  Negative for dizziness, tremors, loss of consciousness and weakness.   Psychiatric/Behavioral:  Positive for memory loss. Negative for hallucinations.          Past Medical History: The patient  has a past medical history of Hypertension.    Social History: The patient  reports that he has never smoked. He has never used smokeless tobacco. He reports current alcohol use. He reports that he does not currently use drugs.    Family History: Their  "family history is not on file.    Allergies: Sulfa (sulfonamide antibiotics)     Meds:   Current Outpatient Medications on File Prior to Visit   Medication Sig Dispense Refill    alfuzosin (UROXATRAL) 10 mg Tb24       amLODIPine (NORVASC) 10 MG tablet Take 10 mg by mouth.      apixaban (ELIQUIS) 5 mg Tab 2.5 mg 2 (two) times daily.      carbidopa-levodopa  mg (SINEMET)  mg per tablet Take 2 tablets by mouth 3 (three) times daily.      losartan (COZAAR) 25 MG tablet       nebivoloL (BYSTOLIC) 5 MG Tab Take 10 mg by mouth once daily.      rasagiline (AZILECT) 1 mg Tab Take 1 tablet (1 mg total) by mouth once daily. 30 tablet 11    terazosin (HYTRIN) 5 MG capsule Take 5 mg by mouth every evening.      traZODone (DESYREL) 100 MG tablet Take 100 mg by mouth every evening. Patient taking 1.5 pills per instance       No current facility-administered medications on file prior to visit.       Exam:  BP (!) 143/82 (BP Location: Left arm, Patient Position: Sitting, BP Method: Medium (Automatic))   Pulse 66   Ht 5' 9" (1.753 m)   Wt 77.5 kg (170 lb 13.7 oz)   BMI 25.23 kg/m²     Constitutional  Well-developed, well-nourished, appears stated age   Ophthalmoscopic  No papilledema with no hemorrhages or exudates bilaterally   Cardiovascular  Radial pulses 2+ and symmetric, no LE edema bilaterally   Neurological    * Mental status  MOCA =      - Orientation  Oriented to person, place, time, and situation     - Memory   Intact recent and remote     - Attention/concentration  Attentive, vigilant during exam     - Language  Naming & repetition intact, +2-step commands     - Fund of knowledge  Aware of current events     - Executive  Well-organized thoughts     - Other     * Cranial nerves       - CN II  PERRL, visual fields full to confrontation     - CN III, IV, VI  Extraocular movements full, normal pursuits and saccades     - CN V  Sensation V1 - V3 intact     - CN VII  Face strong and symmetric bilaterally     - CN " VIII  Hearing intact bilaterally     - CN IX, X  Palate raises midline and symmetric     - CN XI  SCM and trapezius 5/5 bilaterally     - CN XII  Tongue midline   * Motor  Muscle bulk normal, strength 5/5 throughout    4-/5 internally rotating foot    * Sensory   Intact to temperature, decreased vibratory sense at the R ankle only, intact to R big toe, R knee    * Coordination  No dysmetria with finger-to-nose or heel-to-shin   * Gait  See below.   * Deep tendon reflexes  1+ and symmetric throughout   Babinski downgoing bilaterally   * Specialized movement exam  no hypophonic speech.    mild facial masking.   R > L cogwheel rigidity.     R > L  bradykinesia.   mild postural tremor of R hand, no tremor at rest    No other dystonia, chorea, athetosis, myoclonus, or tics.   No motor impersistence.   Narrow-based gait.   mild shortened stride length.   decreased arm swing in R compared to L but relatively good arm swing in clinic today     mild anterior stoop   3-4 en-bloc turn     3-4 steps to recover from pull-test      Laboratory/Radiological:  - Results:  Lab Visit on 06/29/2022   Component Date Value Ref Range Status    Vitamin B-12 06/29/2022 404  180 - 914 ng/L Final    Thiamine 06/29/2022 68  38 - 122 ug/L Final    RPR 06/29/2022 Non-reactive  Non-reactive Final    Vitamin B6 06/29/2022 42  5 - 50 ug/L Final       - Independent review of images:  DaTscan from Dec 2021      - Independent review of consultant's notes:     ASSESSMENT/PLAN:  1. Parkinson's Disease  - typical Parkinson's disease, akinetic rigid, non-motor features of alpha-synucleinopathy such as anosmia and chronic constipation   - currently taking cd/ld 2 tabs TID and 1 mg rasagiline with improvement   - continue BIG and LOUD exercises at home   - some nausea with medication --- > decreased appetite, has lost some weight   - monitor BP at home (both sitting and standing)   - increase water intake, discussed balanced meals -- do not necessarily  worry about protein interaction for now unless it is evident that there is decreased absorption       2. Gait instability   - multifactorial, iPD, lumbar spinal stenosis, possible sensory ataxia      3. Insomnia  - continue trazodone       4. Constipation  - continue OTC meds   - increase water intake     5. R foot numbness/tingling   - history of lumbar fusion   - localizes only to the R ankle, decreased vibratory sense              Follow up: in 3 months with [facility]    Collaborating Physician, Dr. Ahuja, was available during today's encounter. Any change to plan along with cosign to appear in the EMR.       Total time spent with the patient: 46 minutes. 36 minutes of face-to-face consultation and 10 minutes of chart review and coordination of care, on the day of the visit. This includes face to face time and non-face to face time preparing to see the patient (eg, review of tests), obtaining and/or reviewing separately obtained history, documenting clinical information in the electronic or other health record, independently interpreting resultsand communicating results to the patient/family/caregiver, or care coordination.         Rylie Tucker PA-C   [facility]  Department of Neurology  Movement Disorders

## 2022-10-26 ENCOUNTER — PATIENT MESSAGE (OUTPATIENT)
Dept: NEUROLOGY | Facility: CLINIC | Age: 72
End: 2022-10-26
Payer: MEDICARE

## 2022-11-17 ENCOUNTER — PATIENT MESSAGE (OUTPATIENT)
Dept: NEUROLOGY | Facility: CLINIC | Age: 72
End: 2022-11-17
Payer: MEDICARE

## 2022-11-21 ENCOUNTER — OFFICE VISIT (OUTPATIENT)
Dept: NEUROLOGY | Facility: CLINIC | Age: 72
End: 2022-11-21
Payer: MEDICARE

## 2022-11-21 ENCOUNTER — LAB VISIT (OUTPATIENT)
Dept: LAB | Facility: HOSPITAL | Age: 72
End: 2022-11-21
Payer: MEDICARE

## 2022-11-21 VITALS
BODY MASS INDEX: 25.19 KG/M2 | SYSTOLIC BLOOD PRESSURE: 124 MMHG | HEIGHT: 69 IN | WEIGHT: 170.06 LBS | DIASTOLIC BLOOD PRESSURE: 74 MMHG | HEART RATE: 67 BPM

## 2022-11-21 DIAGNOSIS — G25.81 IRON DEFICIENCY ASSOCIATED WITH NONFAMILIAL RESTLESS LEGS SYNDROME: Primary | ICD-10-CM

## 2022-11-21 DIAGNOSIS — M54.16 LUMBAR RADICULOPATHY: ICD-10-CM

## 2022-11-21 DIAGNOSIS — E61.1 IRON DEFICIENCY ASSOCIATED WITH NONFAMILIAL RESTLESS LEGS SYNDROME: Primary | ICD-10-CM

## 2022-11-21 DIAGNOSIS — E61.1 IRON DEFICIENCY ASSOCIATED WITH NONFAMILIAL RESTLESS LEGS SYNDROME: ICD-10-CM

## 2022-11-21 DIAGNOSIS — G25.81 IRON DEFICIENCY ASSOCIATED WITH NONFAMILIAL RESTLESS LEGS SYNDROME: ICD-10-CM

## 2022-11-21 DIAGNOSIS — G62.9 PERIPHERAL POLYNEUROPATHY: ICD-10-CM

## 2022-11-21 LAB
FERRITIN SERPL-MCNC: 107 NG/ML (ref 20–300)
IRON SERPL-MCNC: 93 UG/DL (ref 45–160)
SATURATED IRON: 26 % (ref 20–50)
TOTAL IRON BINDING CAPACITY: 363 UG/DL (ref 250–450)
TRANSFERRIN SERPL-MCNC: 245 MG/DL (ref 200–375)

## 2022-11-21 PROCEDURE — 99213 OFFICE O/P EST LOW 20 MIN: CPT | Mod: PBBFAC | Performed by: PHYSICIAN ASSISTANT

## 2022-11-21 PROCEDURE — 99999 PR PBB SHADOW E&M-EST. PATIENT-LVL III: ICD-10-PCS | Mod: PBBFAC,,, | Performed by: PHYSICIAN ASSISTANT

## 2022-11-21 PROCEDURE — 99214 OFFICE O/P EST MOD 30 MIN: CPT | Mod: S$PBB,,, | Performed by: PHYSICIAN ASSISTANT

## 2022-11-21 PROCEDURE — 36415 COLL VENOUS BLD VENIPUNCTURE: CPT | Performed by: PHYSICIAN ASSISTANT

## 2022-11-21 PROCEDURE — 99999 PR PBB SHADOW E&M-EST. PATIENT-LVL III: CPT | Mod: PBBFAC,,, | Performed by: PHYSICIAN ASSISTANT

## 2022-11-21 PROCEDURE — 82728 ASSAY OF FERRITIN: CPT | Performed by: PHYSICIAN ASSISTANT

## 2022-11-21 PROCEDURE — 84466 ASSAY OF TRANSFERRIN: CPT | Performed by: PHYSICIAN ASSISTANT

## 2022-11-21 PROCEDURE — 99214 PR OFFICE/OUTPT VISIT, EST, LEVL IV, 30-39 MIN: ICD-10-PCS | Mod: S$PBB,,, | Performed by: PHYSICIAN ASSISTANT

## 2022-11-21 NOTE — PROGRESS NOTES
Name: Ulisses Ponce  MRN: [id number]   CSN: [id number]      Date: 11/21/2022    Referring physician:  No referring provider defined for this encounter.    Chief Complaint: PD eval     Interval History:  - right foot, entire foot and up the leg gets numbness and tingling   - occurs whenever he sits down to watch TV    - if he gets up and walks around, it gets better   - feels like his leg is falling asleep    - sometimes feels that it gets better with his PD meds ? Unclear because it happens at random times and not sure whether this is whenever he is due for a dose or not   - also occurs minimally in the L leg   - cant walk log distances, fatigues easily   - does not cross leg often   - follows with Dr. Chung Reagan for lumbar radiculopathy  - lower right back pain   - B12 on the lower side of normal last visit, now supplementing   - no falls   - doing PT and exercise   - uses walker at night to get up and go to the bathroom    - feels meds kick in after 30 minutes   - some dizziness after he takes a dose of cd/ld   - on bystolic, terazosin, amlodipine, losartan         From Sept 2022  - accompanied by spouse   - added rasagiline 1 mg daily   - he has noticed that this has helped   - he continues to get numbness/tingling in his R leg and R foot -- some in the left foot as well -- this has been ongoing for a year   - worse at the end of the day  - some low back pain, had lumbar fusion many years ago  - some dizziness whenever he goes from sitting to standing, he checks his BP whenever he is seated and its normal   - does not drink enough water a day -- drinks a couple bottles   - constipation is an issue as well   - he has lost some weight ~ 7 lbs since last visit -- just saw PCP, didn't think much of it?  - also having some shortness of breath -- discussed this with PCP as well   - a little bit of loss of appetite but still cleans his plate   - drinks coffee in the morning and then doesn't eat until lunch, hasnt  been eating as much protein because of cd/ld   - doing BRIOFIT -- maintenance PT therapy   - does PT 2-3 days a week   - feels better after he exercises, sleeps better as well         From June 2022 Ulisses Ponce is a L HANDED 71 y.o. male with a medical issues significant for hx of cervical fusion, HLD, HTN, lumbar stenosis with neurogenic claudication and KARRI who presents for PD eval. Previously followed by Dr. Coley, on cd/ld 25/100 2 tabs TID. [facility]nd opinion with [facility] [place] in Feb 2022.  Accompanied by wife and daughter. He had previously attributed stiffness to cervical and lumbar DDD. For many years he has had stiffness. It wasn't until memory changes started that he went to the neurologist. He first saw Dr. Leone in Fall 2021. Diagnosed with Parkinson's in January 2022. They were told that he had significant retropulsion and were concerned for NPH. Harder for him to get dressed, get his shoes on. R side is stiffer. Slower speed, stride length has shortened as well. He has not noticed any improvement with the cd/ld. Takes it 6, noon and 6 pm. Makes him feel a little dizzy. No improvement in movements. No falls. Shuffles his feet. Fatigues easily. Tries to stay active, doing PT twice a week and exercises twice weekly at his house as well. Did Big and Loud with [employer]. Has some swaying movements whenever he goes from sitting to standing.   Denies hallucinations/delusions or paranoia. Takes trazodone for sleep which helps. Some difficulty getting out of bed.     Last dose of cd/ld was at noon today. Currently supplementing with B12.     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    IMPRESSION: H&Y Stage III Idiopathic Parkinson's Disease    He presents with his wife having flown in from [place] to visit their daughter who lives in [place]. He saw Dr. Neely yesterday for second opinion on diagnosis of Parkinson Disease. He presents today with me for the Parkinson disease annual Interdisciplinary Clinic at [facility] which includes evaluation from PT, OT, ST. Please see therapy evaluations and recommendations in plan that were fully reviewed with the patient today. In compliance with AAN guidelines, today was the patient's yearly evaluation where the patient was evaluated by PT, OT, and ST, and the patient was assessed for comorbid psychiatric symptoms (completed the HADS-see media tab), cognitive impairment (with appropriate screening test of a MOCA documented below), autonomic dysfunction, sleep disturbances, and fall rate.     MOCA today was 24/30. Orthostatic blood pressures today were positive.    Last year he noticed some minor STM difficulty as well as some gait difficulty, rigidity, and difficulty with getting out of bed. They deny evidence of dream enactment behaviors but he reports loosing his sense of smell 5-10 years ago. He has also dealt with intermittent constipation for years but now uses fiber and stool softeners. Occasionally feels lightheaded on standing but denies significant bladder dysfunction. He denies antipsychotic exposures and there is no relevant family history. No significant hallucinations, staring spells, cognitive fluctuations.  The neurologic examination is notable for parkinsonism given bilateral but slightly asymmetric (R>L) bradykinesia, rigidity and gait changes.   There were no red flag findings by history or on examination concerning for a Parkinson's plus syndrome. Given this, age of onset and gradual progression, this most likely represents idiopathic Parkinson's  disease. He underwent a recent DaTscan which by report showed findings consistent with parkinsonism.  He was somewhat recently started on carbidopa-levodopa  mg IR 1 tablet, 3 times a day and notes no significant subjective benefit. Based on his examination, he would likely benefit from an increased dose of carbidopa-levodopa. He increased from 1 tab TID to 2 tabs TID and noticed a little nausea and dizziness. We discussed that this may have been too quick of an increase and recommend going to 1.5 tabs TID for a week then if tolerating ok can go to 2 tabs TID. If any SE occur, may need to increase even slower and they can let us know.     Nonmotor symptoms include mild cognitive impairment, orthostatic hypotension, and hypophonia. We discussed all of these in detail as below.  We also spent time reviewing his medications. He has an extensive list of supplements that he takes, many of which I have never heard before. We discussed that the only supplements that we recommend with Parkinson's disease are B12, folic acid, and frequently vitamin D depending on lab numbers. We will get labs today to assess these values. The rest of his supplements I recommend he review with his primary care provider or his nutritional list to see which of he can get off of as he likely does not need all of these.      Nonmotor/Premotor ROS:  Anosmia: lost sense of smell 2-3 years  Dysarthria/Hypophonia: softening of his voice, a bit raspy  Dysphagia/Sialorrhea: some difficulty with swallowing pills, no sialorrhea   Depression: none   Cognitive slowing: short term memory issues, loses train of thought   Urinary changes: some leakage, 2/2 BPH   Constipation: chronic constipation, takes OTC supplements, eat prunes   Orthostasis: some with cd/ld   Falls: none   Freezing: none   Micrographia: normal   Sleep issues:  -RBD: none       Review of Systems:   Review of Systems   Constitutional:  Negative for chills, fever and malaise/fatigue.  "  HENT:  Negative for hearing loss.    Eyes:  Negative for blurred vision and double vision.   Respiratory:  Negative for cough, shortness of breath and stridor.    Cardiovascular:  Negative for chest pain and leg swelling.   Gastrointestinal:  Positive for constipation. Negative for diarrhea and nausea.   Genitourinary:  Negative for frequency and urgency.   Musculoskeletal:  Negative for falls.   Skin:  Negative for itching and rash.   Neurological:  Negative for dizziness, tremors, loss of consciousness and weakness.   Psychiatric/Behavioral:  Positive for memory loss. Negative for hallucinations.          Past Medical History: The patient  has a past medical history of Hypertension.    Social History: The patient  reports that he has never smoked. He has never used smokeless tobacco. He reports current alcohol use. He reports that he does not currently use drugs.    Family History: Their family history is not on file.    Allergies: Sulfa (sulfonamide antibiotics)     Meds:   Current Outpatient Medications on File Prior to Visit   Medication Sig Dispense Refill    alfuzosin (UROXATRAL) 10 mg Tb24       amLODIPine (NORVASC) 10 MG tablet Take 10 mg by mouth.      apixaban (ELIQUIS) 5 mg Tab 2.5 mg 2 (two) times daily.      carbidopa-levodopa  mg (SINEMET)  mg per tablet Take 2 tablets by mouth 3 (three) times daily.      losartan (COZAAR) 25 MG tablet       nebivoloL (BYSTOLIC) 5 MG Tab Take 10 mg by mouth once daily.      rasagiline (AZILECT) 1 mg Tab Take 1 tablet (1 mg total) by mouth once daily. 30 tablet 11    terazosin (HYTRIN) 5 MG capsule Take 5 mg by mouth every evening.      traZODone (DESYREL) 100 MG tablet Take 100 mg by mouth every evening. Patient taking 1.5 pills per instance       No current facility-administered medications on file prior to visit.       Exam:  /74 (BP Location: Left arm, Patient Position: Sitting, BP Method: Medium (Automatic))   Pulse 67   Ht 5' 9" (1.753 m)   " Wt 77.2 kg (170 lb 1.4 oz)   BMI 25.12 kg/m²     Constitutional  Well-developed, well-nourished, appears stated age   Ophthalmoscopic  No papilledema with no hemorrhages or exudates bilaterally   Cardiovascular  Radial pulses 2+ and symmetric, no LE edema bilaterally   Neurological    * Mental status  MOCA =      - Orientation  Oriented to person, place, time, and situation     - Memory   Intact recent and remote     - Attention/concentration  Attentive, vigilant during exam     - Language  Naming & repetition intact, +2-step commands     - Fund of knowledge  Aware of current events     - Executive  Well-organized thoughts     - Other     * Cranial nerves       - CN II  PERRL, visual fields full to confrontation     - CN III, IV, VI  Extraocular movements full, normal pursuits and saccades     - CN V  Sensation V1 - V3 intact     - CN VII  Face strong and symmetric bilaterally     - CN VIII  Hearing intact bilaterally     - CN IX, X  Palate raises midline and symmetric     - CN XI  SCM and trapezius 5/5 bilaterally     - CN XII  Tongue midline   * Motor  Slightly decreased muscle bulk at bilateral calves   strength 5/5 throughout    4-/5 internally rotating foot    * Sensory   Intact to temperature and light touch, decreased vibratory sense to bilateral knees   * Coordination  No dysmetria with finger-to-nose or heel-to-shin   * Gait  See below.   * Deep tendon reflexes  1+ and symmetric throughout   Babinski downgoing bilaterally   * Specialized movement exam  no hypophonic speech.    mild facial masking.   R > L cogwheel rigidity.     R > L  bradykinesia.   mild postural tremor of R hand, no tremor at rest    No other dystonia, chorea, athetosis, myoclonus, or tics.   No motor impersistence.   Narrow-based gait.   mild shortened stride length.   decreased arm swing in R compared to L but relatively good arm swing in clinic today     mild anterior stoop   3-4 en-bloc turn     3-4 steps to recover from pull-test       Laboratory/Radiological:  - Results:  No visits with results within 3 Month(s) from this visit.   Latest known visit with results is:   Lab Visit on 06/29/2022   Component Date Value Ref Range Status    Vitamin B-12 06/29/2022 404  180 - 914 ng/L Final    Thiamine 06/29/2022 68  38 - 122 ug/L Final    RPR 06/29/2022 Non-reactive  Non-reactive Final    Vitamin B6 06/29/2022 42  5 - 50 ug/L Final       - Independent review of images:  DaTscan from Dec 2021      - Independent review of consultant's notes:     ASSESSMENT/PLAN:  1. Parkinson's Disease  - typical Parkinson's disease, akinetic rigid, non-motor features of alpha-synucleinopathy such as anosmia and chronic constipation   - currently taking cd/ld 2 tabs TID and 1 mg rasagiline with improvement   - continue BIG and LOUD exercises at home   - some nausea with medication --- > decreased appetite, has lost some weight   - monitor BP at home (both sitting and standing)   - increase water intake, discussed balanced meals -- do not necessarily worry about protein interaction for now unless it is evident that there is decreased absorption       2. Gait instability   - multifactorial, iPD, lumbar spinal stenosis, possible sensory ataxia      3. Insomnia  - continue trazodone       4. Constipation  - continue OTC meds   - increase water intake     5. R foot numbness/tingling   - EMG  - decreased vibratory sense to bilateral LE   - hx of lumbar fusion   - borderline low b12, now supplementing with B12  - add alpha-lipoic acid   - may be atypical presentation of RLS, improves with walking, worse whenever he is seated   - check iron levels           Orders Placed This Encounter    IRON AND TIBC    Ferritin    EMG W/ ULTRASOUND AND NERVE CONDUCTION TEST 2 Extremities         Follow up: in 3 months with [facility]    Collaborating Physician, Dr. Ahuja, was available during today's encounter. Any change to plan along with cosign to appear in the EMR.       Total time spent  with the patient: 46 minutes. 36 minutes of face-to-face consultation and 10 minutes of chart review and coordination of care, on the day of the visit. This includes face to face time and non-face to face time preparing to see the patient (eg, review of tests), obtaining and/or reviewing separately obtained history, documenting clinical information in the electronic or other health record, independently interpreting resultsand communicating results to the patient/family/caregiver, or care coordination.         Rylie Tucker PA-C   [facility]  Department of Neurology  Movement Disorders

## 2022-11-22 ENCOUNTER — TELEPHONE (OUTPATIENT)
Dept: NEUROLOGY | Facility: CLINIC | Age: 72
End: 2022-11-22
Payer: MEDICARE

## 2022-11-22 NOTE — TELEPHONE ENCOUNTER
----- Message from Darin Paula MA sent at 11/21/2022 11:43 AM CST -----  Brent Qiu,     Can you help us schedule the attached patient for an EMG? Many thanks!

## 2022-11-22 NOTE — TELEPHONE ENCOUNTER
I left a message for Mr. Ponce informing him that I scheduled him for the next available EMG appointment which is Monday, January 23, 2023 at 9am. I also placed him on the wait list just in case something sooner should become available. My direct contact information was left in case of any questions or concerns. Darin FALCON from referring provider was also informed of the appointment time and date.

## 2022-12-16 ENCOUNTER — PATIENT MESSAGE (OUTPATIENT)
Dept: NEUROLOGY | Facility: CLINIC | Age: 72
End: 2022-12-16
Payer: MEDICARE

## 2023-01-23 ENCOUNTER — PROCEDURE VISIT (OUTPATIENT)
Dept: NEUROLOGY | Facility: CLINIC | Age: 73
End: 2023-01-23
Payer: MEDICARE

## 2023-01-23 DIAGNOSIS — G62.9 PERIPHERAL POLYNEUROPATHY: ICD-10-CM

## 2023-01-23 DIAGNOSIS — M54.16 LUMBAR RADICULOPATHY: ICD-10-CM

## 2023-01-23 PROCEDURE — 95886 MUSC TEST DONE W/N TEST COMP: CPT | Mod: 26,S$PBB,, | Performed by: PSYCHIATRY & NEUROLOGY

## 2023-01-23 PROCEDURE — 95886 PR EMG COMPLETE, W/ NERVE CONDUCTION STUDIES, 5+ MUSCLES: ICD-10-PCS | Mod: 26,S$PBB,, | Performed by: PSYCHIATRY & NEUROLOGY

## 2023-01-23 PROCEDURE — 95910 NRV CNDJ TEST 7-8 STUDIES: CPT | Mod: 26,S$PBB,, | Performed by: PSYCHIATRY & NEUROLOGY

## 2023-01-23 PROCEDURE — 95886 MUSC TEST DONE W/N TEST COMP: CPT | Mod: PBBFAC | Performed by: PSYCHIATRY & NEUROLOGY

## 2023-01-23 PROCEDURE — 95910 PR NERVE CONDUCTION STUDY; 7-8 STUDIES: ICD-10-PCS | Mod: 26,S$PBB,, | Performed by: PSYCHIATRY & NEUROLOGY

## 2023-01-23 PROCEDURE — 95910 NRV CNDJ TEST 7-8 STUDIES: CPT | Mod: PBBFAC | Performed by: PSYCHIATRY & NEUROLOGY

## 2023-01-23 NOTE — PROCEDURES
Procedures      Please see NCS/EMG procedure report    Avelino Harrington MD  [facility]

## 2023-01-24 ENCOUNTER — PATIENT MESSAGE (OUTPATIENT)
Dept: NEUROLOGY | Facility: CLINIC | Age: 73
End: 2023-01-24
Payer: MEDICARE

## 2023-01-30 ENCOUNTER — TELEPHONE (OUTPATIENT)
Dept: OPHTHALMOLOGY | Facility: CLINIC | Age: 73
End: 2023-01-30
Payer: MEDICARE

## 2023-01-30 NOTE — TELEPHONE ENCOUNTER
----- Message from Rita Castillo sent at 1/30/2023 11:41 AM CST -----  Patient is calling to schedule cat eval.  Patient stated that Emmanuel Fournier recommended Dr. Starr.  Please contact pt at [phone].

## 2023-02-02 ENCOUNTER — OFFICE VISIT (OUTPATIENT)
Dept: NEUROLOGY | Facility: CLINIC | Age: 73
End: 2023-02-02
Payer: MEDICARE

## 2023-02-02 DIAGNOSIS — G20.A1 PD (PARKINSON'S DISEASE): Primary | ICD-10-CM

## 2023-02-02 PROCEDURE — 99215 PR OFFICE/OUTPT VISIT, EST, LEVL V, 40-54 MIN: ICD-10-PCS | Mod: S$PBB,,, | Performed by: PSYCHIATRY & NEUROLOGY

## 2023-02-02 PROCEDURE — 99215 OFFICE O/P EST HI 40 MIN: CPT | Mod: S$PBB,,, | Performed by: PSYCHIATRY & NEUROLOGY

## 2023-02-02 RX ORDER — TRAZODONE HYDROCHLORIDE 100 MG/1
150 TABLET ORAL NIGHTLY
Qty: 135 TABLET | Refills: 3 | Status: SHIPPED | OUTPATIENT
Start: 2023-02-02 | End: 2023-10-09

## 2023-02-02 NOTE — PROGRESS NOTES
Name: Ulisses Ponce  MRN: [id number]   CSN: [id number]      Date: 02/02/2023    Referring physician:  No referring provider defined for this encounter.    Chief Complaint: PD eval     Interval History:  - 1st visit with me  - dx with PD, DatSCAN+, hx of RLS  - on cd/ld 25/100 2 TID and rasagiline  - has side effects of some LH and some sleepiness with each dose for 40 minutes  - has noted some worries about forgetting things as he gets   - discussed   -    From 11/21/22 ([facility]BR)  - right foot, entire foot and up the leg gets numbness and tingling   - occurs whenever he sits down to watch TV    - if he gets up and walks around, it gets better   - feels like his leg is falling asleep    - sometimes feels that it gets better with his PD meds ? Unclear because it happens at random times and not sure whether this is whenever he is due for a dose or not   - also occurs minimally in the L leg   - cant walk log distances, fatigues easily   - does not cross leg often   - follows with Dr. Chung Reagan for lumbar radiculopathy  - lower right back pain   - B12 on the lower side of normal last visit, now supplementing   - no falls   - doing PT and exercise   - uses walker at night to get up and go to the bathroom    - feels meds kick in after 30 minutes   - some dizziness after he takes a dose of cd/ld   - on bystolic, terazosin, amlodipine, losartan         From Sept 2022  - accompanied by spouse   - added rasagiline 1 mg daily   - he has noticed that this has helped   - he continues to get numbness/tingling in his R leg and R foot -- some in the left foot as well -- this has been ongoing for a year   - worse at the end of the day  - some low back pain, had lumbar fusion many years ago  - some dizziness whenever he goes from sitting to standing, he checks his BP whenever he is seated and its normal   - does not drink enough water a day -- drinks a couple bottles   - constipation is an issue as well   - he has lost some weight  ~ 7 lbs since last visit -- just saw PCP, didn't think much of it?  - also having some shortness of breath -- discussed this with PCP as well   - a little bit of loss of appetite but still cleans his plate   - drinks coffee in the morning and then doesn't eat until lunch, hasnt been eating as much protein because of cd/ld   - doing BRIOFIT -- maintenance PT therapy   - does PT 2-3 days a week   - feels better after he exercises, sleeps better as well         From June 2022 Ulisses Ponce is a L HANDED 72 y.o. male with a medical issues significant for hx of cervical fusion, HLD, HTN, lumbar stenosis with neurogenic claudication and KARRI who presents for PD eval. Previously followed by Dr. Coley, on cd/ld 25/100 2 tabs TID. [facility]nd opinion with [facility] [place] in Feb 2022.  Accompanied by wife and daughter. He had previously attributed stiffness to cervical and lumbar DDD. For many years he has had stiffness. It wasn't until memory changes started that he went to the neurologist. He first saw Dr. Leone in Fall 2021. Diagnosed with Parkinson's in January 2022. They were told that he had significant retropulsion and were concerned for NPH. Harder for him to get dressed, get his shoes on. R side is stiffer. Slower speed, stride length has shortened as well. He has not noticed any improvement with the cd/ld. Takes it 6, noon and 6 pm. Makes him feel a little dizzy. No improvement in movements. No falls. Shuffles his feet. Fatigues easily. Tries to stay active, doing PT twice a week and exercises twice weekly at his house as well. Did Big and Loud with [employer]. Has some swaying movements whenever he goes from sitting to standing.   Denies hallucinations/delusions or paranoia. Takes trazodone for sleep which helps. Some difficulty getting out of bed.     Last dose of cd/ld was at noon today. Currently supplementing with B12.     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    IMPRESSION: H&Y Stage III Idiopathic Parkinson's Disease    He presents with his wife having flown in from [place] to visit their daughter who lives in [place]. He saw Dr. Neely yesterday for second opinion on diagnosis of Parkinson Disease. He presents today with me for the Parkinson disease annual Interdisciplinary Clinic at [facility] which includes evaluation from PT, OT, ST. Please see therapy evaluations and recommendations in plan that were fully reviewed with the patient today. In compliance with AAN guidelines, today was the patient's yearly evaluation where the patient was evaluated by PT, OT, and ST, and the patient was assessed for comorbid psychiatric symptoms (completed the HADS-see media tab), cognitive impairment (with appropriate screening test of a MOCA documented below), autonomic dysfunction, sleep disturbances, and fall rate.     MOCA today was 24/30. Orthostatic blood pressures today were positive.    Last year he noticed some minor STM difficulty as well as some gait difficulty, rigidity, and difficulty with getting out of bed. They deny evidence of dream enactment behaviors but he reports loosing his sense of smell 5-10 years ago. He has also dealt with intermittent constipation for years but now uses fiber and stool softeners. Occasionally feels lightheaded on standing but denies significant bladder dysfunction. He denies antipsychotic exposures and there is no relevant family history. No significant hallucinations, staring spells, cognitive fluctuations.  The neurologic examination is notable for parkinsonism given bilateral but  slightly asymmetric (R>L) bradykinesia, rigidity and gait changes.   There were no red flag findings by history or on examination concerning for a Parkinson's plus syndrome. Given this, age of onset and gradual progression, this most likely represents idiopathic Parkinson's disease. He underwent a recent DaTscan which by report showed findings consistent with parkinsonism.  He was somewhat recently started on carbidopa-levodopa  mg IR 1 tablet, 3 times a day and notes no significant subjective benefit. Based on his examination, he would likely benefit from an increased dose of carbidopa-levodopa. He increased from 1 tab TID to 2 tabs TID and noticed a little nausea and dizziness. We discussed that this may have been too quick of an increase and recommend going to 1.5 tabs TID for a week then if tolerating ok can go to 2 tabs TID. If any SE occur, may need to increase even slower and they can let us know.     Nonmotor symptoms include mild cognitive impairment, orthostatic hypotension, and hypophonia. We discussed all of these in detail as below.  We also spent time reviewing his medications. He has an extensive list of supplements that he takes, many of which I have never heard before. We discussed that the only supplements that we recommend with Parkinson's disease are B12, folic acid, and frequently vitamin D depending on lab numbers. We will get labs today to assess these values. The rest of his supplements I recommend he review with his primary care provider or his nutritional list to see which of he can get off of as he likely does not need all of these.      Nonmotor/Premotor ROS:  Anosmia: lost sense of smell 2-3 years  Dysarthria/Hypophonia: softening of his voice, a bit raspy  Dysphagia/Sialorrhea: some difficulty with swallowing pills, no sialorrhea   Depression: none   Cognitive slowing: short term memory issues, loses train of thought   Urinary changes: some leakage, 2/2 BPH   Constipation: chronic  constipation, takes OTC supplements, eat prunes   Orthostasis: some with cd/ld   Falls: none   Freezing: none   Micrographia: normal   Sleep issues:  -RBD: none       Review of Systems:   Review of Systems   Constitutional:  Negative for chills, fever and malaise/fatigue.   HENT:  Negative for hearing loss.    Eyes:  Negative for blurred vision and double vision.   Respiratory:  Negative for cough, shortness of breath and stridor.    Cardiovascular:  Negative for chest pain and leg swelling.   Gastrointestinal:  Positive for constipation. Negative for diarrhea and nausea.   Genitourinary:  Negative for frequency and urgency.   Musculoskeletal:  Negative for falls.   Skin:  Negative for itching and rash.   Neurological:  Negative for dizziness, tremors, loss of consciousness and weakness.   Psychiatric/Behavioral:  Positive for memory loss. Negative for hallucinations.          Past Medical History: The patient  has a past medical history of Hypertension.    Social History: The patient  reports that he has never smoked. He has never used smokeless tobacco. He reports current alcohol use. He reports that he does not currently use drugs.    Family History: Their family history is not on file.    Allergies: Sulfa (sulfonamide antibiotics)     Meds:   Current Outpatient Medications on File Prior to Visit   Medication Sig Dispense Refill    alfuzosin (UROXATRAL) 10 mg Tb24       amLODIPine (NORVASC) 10 MG tablet Take 10 mg by mouth.      apixaban (ELIQUIS) 5 mg Tab 2.5 mg 2 (two) times daily.      carbidopa-levodopa  mg (SINEMET)  mg per tablet Take 2 tablets by mouth 3 (three) times daily.      losartan (COZAAR) 25 MG tablet       nebivoloL (BYSTOLIC) 5 MG Tab Take 10 mg by mouth once daily.      rasagiline (AZILECT) 1 mg Tab Take 1 tablet (1 mg total) by mouth once daily. 30 tablet 11    terazosin (HYTRIN) 5 MG capsule Take 5 mg by mouth every evening.      traZODone (DESYREL) 100 MG tablet Take 100 mg by  mouth every evening. Patient taking 1.5 pills per instance       No current facility-administered medications on file prior to visit.       Exam:  There were no vitals taken for this visit.    Constitutional  Well-developed, well-nourished, appears stated age   Ophthalmoscopic  No papilledema with no hemorrhages or exudates bilaterally   Cardiovascular  Radial pulses 2+ and symmetric, no LE edema bilaterally   Neurological    * Mental status  MOCA =      - Orientation  Oriented to person, place, time, and situation     - Memory   Intact recent and remote     - Attention/concentration  Attentive, vigilant during exam     - Language  Naming & repetition intact, +2-step commands     - Fund of knowledge  Aware of current events     - Executive  Well-organized thoughts     - Other     * Cranial nerves       - CN II  PERRL, visual fields full to confrontation     - CN III, IV, VI  Extraocular movements full, normal pursuits and saccades     - CN V  Sensation V1 - V3 intact     - CN VII  Face strong and symmetric bilaterally     - CN VIII  Hearing intact bilaterally     - CN IX, X  Palate raises midline and symmetric     - CN XI  SCM and trapezius 5/5 bilaterally     - CN XII  Tongue midline   * Motor  Slightly decreased muscle bulk at bilateral calves   strength 5/5 throughout    4-/5 internally rotating foot    * Sensory   Intact to temperature and light touch, decreased vibratory sense to bilateral knees   * Coordination  No dysmetria with finger-to-nose or heel-to-shin   * Gait  See below.   * Deep tendon reflexes  1+ and symmetric throughout   Babinski downgoing bilaterally   * Specialized movement exam  no hypophonic speech.    mild facial masking.   R > L cogwheel rigidity.     R > L  bradykinesia.   mild postural tremor of R hand, no tremor at rest    No other dystonia, chorea, athetosis, myoclonus, or tics.   No motor impersistence.   Narrow-based gait.   mild shortened stride length.   decreased arm swing in R  compared to L but relatively good arm swing in clinic today     mild anterior stoop   3-4 en-bloc turn     3-4 steps to recover from pull-test      Laboratory/Radiological:  - Results:  Lab Visit on 11/21/2022   Component Date Value Ref Range Status    Iron 11/21/2022 93  45 - 160 ug/dL Final    Transferrin 11/21/2022 245  200 - 375 mg/dL Final    TIBC 11/21/2022 363  250 - 450 ug/dL Final    Saturated Iron 11/21/2022 26  20 - 50 % Final    Ferritin 11/21/2022 107  20.0 - 300.0 ng/mL Final       - Independent review of images:  DaTscan from Dec 2021      - Independent review of consultant's notes:     ASSESSMENT/PLAN:  1. Parkinson's Disease  - typical Parkinson's disease, akinetic rigid, non-motor features of alpha-synucleinopathy such as anosmia and chronic constipation   - currently taking cd/ld 2 tabs TID and 1 mg rasagiline with improvement   - continue BIG and LOUD exercises at home   - some nausea with medication --- > decreased appetite, has lost some weight   - monitor BP at home (both sitting and standing)   - increase water intake, discussed balanced meals -- do not necessarily worry about protein interaction for now unless it is evident that there is decreased absorption       2. Gait instability   - multifactorial, iPD, lumbar spinal stenosis, possible sensory ataxia      3. Insomnia  - continue trazodone       4. Constipation  - continue OTC meds   - increase water intake     5. R foot numbness/tingling   - EMG  - decreased vibratory sense to bilateral LE   - hx of lumbar fusion   - borderline low b12, now supplementing with B12  - add alpha-lipoic acid   - may be atypical presentation of RLS, improves with walking, worse whenever he is seated   - check iron levels

## 2023-05-29 RX ORDER — RASAGILINE 1 MG/1
TABLET ORAL
Qty: 30 TABLET | Refills: 11 | Status: SHIPPED | OUTPATIENT
Start: 2023-05-29 | End: 2023-08-30 | Stop reason: SDUPTHER

## 2023-06-06 ENCOUNTER — TELEPHONE (OUTPATIENT)
Dept: NEUROLOGY | Facility: CLINIC | Age: 73
End: 2023-06-06
Payer: MEDICARE

## 2023-06-06 NOTE — TELEPHONE ENCOUNTER
----- Message from Anai Robles sent at 6/6/2023 10:56 AM CDT -----  Contact: @ [phone]  Pt requesting a appointment for the following  Movement Disorders (Parkinson's disease, [place]'s disease, Dystonia, Tremors, Muscle Spasticity, Gait (walking) instability, Abnormal movements) follow up he is looking to make a appointment in August ..Please call and adv @ [phone]

## 2023-08-30 ENCOUNTER — OFFICE VISIT (OUTPATIENT)
Dept: NEUROLOGY | Facility: CLINIC | Age: 73
End: 2023-08-30
Payer: MEDICARE

## 2023-08-30 VITALS
BODY MASS INDEX: 25.21 KG/M2 | HEART RATE: 62 BPM | SYSTOLIC BLOOD PRESSURE: 137 MMHG | HEIGHT: 69 IN | DIASTOLIC BLOOD PRESSURE: 75 MMHG | WEIGHT: 170.19 LBS

## 2023-08-30 DIAGNOSIS — G20.B2 PARKINSON'S DISEASE WITH DYSKINESIA AND FLUCTUATING MANIFESTATIONS: ICD-10-CM

## 2023-08-30 DIAGNOSIS — M48.062 LUMBAR STENOSIS WITH NEUROGENIC CLAUDICATION: Primary | ICD-10-CM

## 2023-08-30 PROCEDURE — 99999 PR PBB SHADOW E&M-EST. PATIENT-LVL III: CPT | Mod: PBBFAC,,, | Performed by: PSYCHIATRY & NEUROLOGY

## 2023-08-30 PROCEDURE — 99214 OFFICE O/P EST MOD 30 MIN: CPT | Mod: S$PBB,,, | Performed by: PSYCHIATRY & NEUROLOGY

## 2023-08-30 PROCEDURE — 99213 OFFICE O/P EST LOW 20 MIN: CPT | Mod: PBBFAC | Performed by: PSYCHIATRY & NEUROLOGY

## 2023-08-30 RX ORDER — CHOLECALCIFEROL (VITAMIN D3) 25 MCG
1000 TABLET ORAL
COMMUNITY

## 2023-08-30 RX ORDER — CARBIDOPA AND LEVODOPA 25; 100 MG/1; MG/1
2 TABLET ORAL 3 TIMES DAILY
Qty: 180 TABLET | Refills: 3 | Status: SHIPPED | OUTPATIENT
Start: 2023-08-30 | End: 2024-06-07 | Stop reason: SDUPTHER

## 2023-08-30 RX ORDER — RASAGILINE 1 MG/1
1 TABLET ORAL DAILY
Qty: 90 TABLET | Refills: 3 | Status: SHIPPED | OUTPATIENT
Start: 2023-08-30

## 2023-08-30 RX ORDER — MAGNESIUM GLUCONATE
POWDER (GRAM) MISCELLANEOUS
COMMUNITY
End: 2024-05-15 | Stop reason: SDUPTHER

## 2023-08-30 RX ORDER — CALCIUM CARBONATE/VITAMIN D3 250-3.125
1 TABLET ORAL
COMMUNITY
End: 2024-05-15 | Stop reason: SDUPTHER

## 2023-08-30 RX ORDER — FOLIC ACID 0.8 MG
800 TABLET ORAL
COMMUNITY
End: 2024-05-15 | Stop reason: SDUPTHER

## 2023-08-30 RX ORDER — LANOLIN ALCOHOL/MO/W.PET/CERES
1000 CREAM (GRAM) TOPICAL
COMMUNITY
End: 2024-05-15 | Stop reason: SDUPTHER

## 2023-08-30 RX ORDER — LOSARTAN POTASSIUM AND HYDROCHLOROTHIAZIDE 25; 100 MG/1; MG/1
1 TABLET ORAL
COMMUNITY
Start: 2023-07-31 | End: 2024-05-15 | Stop reason: SDUPTHER

## 2023-08-30 RX ORDER — KETOCONAZOLE 20 MG/ML
SHAMPOO, SUSPENSION TOPICAL
COMMUNITY
End: 2024-05-15 | Stop reason: SDUPTHER

## 2023-08-30 RX ORDER — KETOCONAZOLE 20 MG/G
CREAM TOPICAL
COMMUNITY
End: 2024-05-15 | Stop reason: SDUPTHER

## 2023-08-30 RX ORDER — IBUPROFEN 200 MG
200 TABLET ORAL EVERY 6 HOURS PRN
COMMUNITY

## 2023-08-30 NOTE — PROGRESS NOTES
Name: Ulisses Ponce  MRN: [id number]   CSN: [id number]      Date: 08/30/2023    Referring physician:  No referring provider defined for this encounter.    Chief Complaint: PD eval     Interval History:  - getting exosomes and NAD via Dr. Jackson  - wife thinks his focus is btter, maybe his gait is getting better  - he admits more stability  - walking for exercise   - no falls   - accompanied by spouse         From Feb 2023  - 1st visit with me  - dx with PD, DatSCAN+, hx of RLS  - on cd/ld 25/100 2 TID and rasagiline  - has side effects of some LH and some sleepiness with each dose for 40 minutes  - has noted some worries about forgetting things as he gets   - discussed   -    From 11/21/22 ([facility])  - right foot, entire foot and up the leg gets numbness and tingling   - occurs whenever he sits down to watch TV    - if he gets up and walks around, it gets better   - feels like his leg is falling asleep    - sometimes feels that it gets better with his PD meds ? Unclear because it happens at random times and not sure whether this is whenever he is due for a dose or not   - also occurs minimally in the L leg   - cant walk log distances, fatigues easily   - does not cross leg often   - follows with Dr. Chung Reagan for lumbar radiculopathy  - lower right back pain   - B12 on the lower side of normal last visit, now supplementing   - no falls   - doing PT and exercise   - uses walker at night to get up and go to the bathroom    - feels meds kick in after 30 minutes   - some dizziness after he takes a dose of cd/ld   - on bystolic, terazosin, amlodipine, losartan         From Sept 2022  - accompanied by spouse   - added rasagiline 1 mg daily   - he has noticed that this has helped   - he continues to get numbness/tingling in his R leg and R foot -- some in the left foot as well -- this has been ongoing for a year   - worse at the end of the day  - some low back pain, had lumbar fusion many years ago  - some  dizziness whenever he goes from sitting to standing, he checks his BP whenever he is seated and its normal   - does not drink enough water a day -- drinks a couple bottles   - constipation is an issue as well   - he has lost some weight ~ 7 lbs since last visit -- just saw PCP, didn't think much of it?  - also having some shortness of breath -- discussed this with PCP as well   - a little bit of loss of appetite but still cleans his plate   - drinks coffee in the morning and then doesn't eat until lunch, hasnt been eating as much protein because of cd/ld   - doing BRIOFIT -- maintenance PT therapy   - does PT 2-3 days a week   - feels better after he exercises, sleeps better as well         From June 2022 Ulisses Ponce is a L HANDED 72 y.o. male with a medical issues significant for hx of cervical fusion, HLD, HTN, lumbar stenosis with neurogenic claudication and KARRI who presents for PD eval. Previously followed by Dr. Coley, on cd/ld 25/100 2 tabs TID. [facility]nd opinion with [facility] [place] in Feb 2022.  Accompanied by wife and daughter. He had previously attributed stiffness to cervical and lumbar DDD. For many years he has had stiffness. It wasn't until memory changes started that he went to the neurologist. He first saw Dr. Leone in Fall 2021. Diagnosed with Parkinson's in January 2022. They were told that he had significant retropulsion and were concerned for NPH. Harder for him to get dressed, get his shoes on. R side is stiffer. Slower speed, stride length has shortened as well. He has not noticed any improvement with the cd/ld. Takes it 6, noon and 6 pm. Makes him feel a little dizzy. No improvement in movements. No falls. Shuffles his feet. Fatigues easily. Tries to stay active, doing PT twice a week and exercises twice weekly at his house as well. Did Big and Loud with [employer]. Has some swaying movements whenever he goes from sitting to standing.   Denies  hallucinations/delusions or paranoia. Takes trazodone for sleep which helps. Some difficulty getting out of bed.     Last dose of cd/ld was at noon today. Currently supplementing with B12.     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    IMPRESSION: H&Y Stage III Idiopathic Parkinson's Disease    He presents with his wife having flown in from [place] to visit their daughter who lives in [place]. He saw Dr. Neely yesterday for second opinion on diagnosis of Parkinson Disease. He presents today with me for the Parkinson disease annual Interdisciplinary Clinic at [facility] which includes evaluation from PT, OT, ST. Please see therapy evaluations and recommendations in plan that were fully reviewed with the patient today. In compliance with AAN guidelines, today was the patient's yearly evaluation where the patient was evaluated by PT, OT, and ST, and the patient was assessed for comorbid psychiatric symptoms (completed the HADS-see media tab), cognitive impairment (with appropriate screening test of a MOCA documented below), autonomic dysfunction, sleep disturbances, and fall rate.     MOCA today was 24/30. Orthostatic blood pressures today were positive.    Last year he noticed some minor STM difficulty as well as some gait difficulty, rigidity, and difficulty with getting out of bed. They deny evidence of dream enactment behaviors but he reports loosing his sense of smell 5-10 years ago. He has also dealt with intermittent constipation for years but now uses fiber and stool softeners. Occasionally feels lightheaded on standing but denies  significant bladder dysfunction. He denies antipsychotic exposures and there is no relevant family history. No significant hallucinations, staring spells, cognitive fluctuations.  The neurologic examination is notable for parkinsonism given bilateral but slightly asymmetric (R>L) bradykinesia, rigidity and gait changes.   There were no red flag findings by history or on examination concerning for a Parkinson's plus syndrome. Given this, age of onset and gradual progression, this most likely represents idiopathic Parkinson's disease. He underwent a recent DaTscan which by report showed findings consistent with parkinsonism.  He was somewhat recently started on carbidopa-levodopa  mg IR 1 tablet, 3 times a day and notes no significant subjective benefit. Based on his examination, he would likely benefit from an increased dose of carbidopa-levodopa. He increased from 1 tab TID to 2 tabs TID and noticed a little nausea and dizziness. We discussed that this may have been too quick of an increase and recommend going to 1.5 tabs TID for a week then if tolerating ok can go to 2 tabs TID. If any SE occur, may need to increase even slower and they can let us know.     Nonmotor symptoms include mild cognitive impairment, orthostatic hypotension, and hypophonia. We discussed all of these in detail as below.  We also spent time reviewing his medications. He has an extensive list of supplements that he takes, many of which I have never heard before. We discussed that the only supplements that we recommend with Parkinson's disease are B12, folic acid, and frequently vitamin D depending on lab numbers. We will get labs today to assess these values. The rest of his supplements I recommend he review with his primary care provider or his nutritional list to see which of he can get off of as he likely does not need all of these.      Nonmotor/Premotor ROS:  Anosmia: lost sense of smell 2-3 years  Dysarthria/Hypophonia: softening  of his voice, a bit raspy  Dysphagia/Sialorrhea: some difficulty with swallowing pills, no sialorrhea   Depression: none   Cognitive slowing: short term memory issues, loses train of thought   Urinary changes: some leakage, 2/2 BPH   Constipation: chronic constipation, takes OTC supplements, eat prunes   Orthostasis: some with cd/ld   Falls: none   Freezing: none   Micrographia: normal   Sleep issues:  -RBD: none       Review of Systems:   Review of Systems   Constitutional:  Negative for chills, fever and malaise/fatigue.   HENT:  Negative for hearing loss.    Eyes:  Negative for blurred vision and double vision.   Respiratory:  Negative for cough, shortness of breath and stridor.    Cardiovascular:  Negative for chest pain and leg swelling.   Gastrointestinal:  Positive for constipation. Negative for diarrhea and nausea.   Genitourinary:  Negative for frequency and urgency.   Musculoskeletal:  Negative for falls.   Skin:  Negative for itching and rash.   Neurological:  Negative for dizziness, tremors, loss of consciousness and weakness.   Psychiatric/Behavioral:  Positive for memory loss. Negative for hallucinations.            Past Medical History: The patient  has a past medical history of Hypertension.    Social History: The patient  reports that he has never smoked. He has never used smokeless tobacco. He reports current alcohol use. He reports that he does not currently use drugs.    Family History: Their family history is not on file.    Allergies: Sulfa (sulfonamide antibiotics)     Meds:   Current Outpatient Medications on File Prior to Visit   Medication Sig Dispense Refill    alfuzosin (UROXATRAL) 10 mg Tb24 Pt takes 10mg BID      apixaban (ELIQUIS) 5 mg Tab 2.5 mg 2 (two) times daily.      calcium carbonate-vitamin D3 250-125 mg 250 mg-3.125 mcg (125 unit) Tab Take 1 tablet by mouth.      cyanocobalamin (VITAMIN B-12) 1000 MCG tablet Take 1,000 mcg by mouth.      folic acid (FOLVITE) 800 MCG Tab Take  "800 mcg by mouth.      GLUCOSAMINE SULFATE ORAL Take by mouth.      ibuprofen (ADVIL,MOTRIN) 200 MG tablet Take 200 mg by mouth every 6 (six) hours as needed.      ketoconazole (NIZORAL) 2 % cream ketoconazole 2 % topical cream      ketoconazole (NIZORAL) 2 % shampoo ketoconazole 2 % shampoo   APPLY TO FACE AND EAR. LET SIT FOR 2 MINUTES THEN RINSE DAILY FOR 1 MONTH      losartan (COZAAR) 25 MG tablet       losartan-hydrochlorothiazide 100-25 mg (HYZAAR) 100-25 mg per tablet Take 1 tablet by mouth.      magnesium gluconate, bulk, Powd by Other route.      nebivoloL (BYSTOLIC) 5 MG Tab Take 10 mg by mouth once daily. Pt taking 5mg daily      terazosin (HYTRIN) 5 MG capsule Take 5 mg by mouth every evening.      traZODone (DESYREL) 100 MG tablet Take 1.5 tablets (150 mg total) by mouth every evening. 135 tablet 3    vitamin D (VITAMIN D3) 1000 units Tab Take 1,000 Units by mouth.      [DISCONTINUED] carbidopa-levodopa  mg (SINEMET)  mg per tablet Take 2 tablets by mouth 3 (three) times daily.      [DISCONTINUED] rasagiline (AZILECT) 1 mg Tab TAKE 1 TABLET(1 MG) BY MOUTH EVERY DAY 30 tablet 11    amLODIPine (NORVASC) 10 MG tablet Take 10 mg by mouth. Unsure if taking 5mg or 10mg      [DISCONTINUED] DOCOSAHEXAENOIC ACID ORAL Take by mouth.       No current facility-administered medications on file prior to visit.       Exam:  /75 (BP Location: Left arm, Patient Position: Sitting, BP Method: Large (Automatic))   Pulse 62   Ht 5' 9" (1.753 m)   Wt 77.2 kg (170 lb 3.1 oz)   BMI 25.13 kg/m²     Constitutional  Well-developed, well-nourished, appears stated age   Ophthalmoscopic  No papilledema with no hemorrhages or exudates bilaterally   Cardiovascular  Radial pulses 2+ and symmetric, no LE edema bilaterally   Neurological    * Mental status  MOCA =      - Orientation  Oriented to person, place, time, and situation     - Memory   Intact recent and remote     - Attention/concentration  Attentive, " vigilant during exam     - Language  Naming & repetition intact, +2-step commands     - Fund of knowledge  Aware of current events     - Executive  Well-organized thoughts     - Other     * Cranial nerves       - CN II  PERRL, visual fields full to confrontation     - CN III, IV, VI  Extraocular movements full, normal pursuits and saccades     - CN V  Sensation V1 - V3 intact     - CN VII  Face strong and symmetric bilaterally     - CN VIII  Hearing intact bilaterally     - CN IX, X  Palate raises midline and symmetric     - CN XI  SCM and trapezius 5/5 bilaterally     - CN XII  Tongue midline   * Motor  Slightly decreased muscle bulk at bilateral calves   strength 5/5 throughout    4-/5 internally rotating foot    * Sensory   Intact to temperature and light touch, decreased vibratory sense to bilateral knees   * Coordination  No dysmetria with finger-to-nose or heel-to-shin   * Gait  See below.   * Deep tendon reflexes  1+ and symmetric throughout   Babinski downgoing bilaterally   * Specialized movement exam  mild hypophonic speech.    mild facial masking.   R > L cogwheel rigidity.     R > L  bradykinesia.   mild postural tremor of R hand, no tremor at rest    No other dystonia, chorea, athetosis, myoclonus, or tics.   No motor impersistence.   wider-based gait.   mild shortened stride length.   decreased arm swing in R compared to L but relatively good arm swing in clinic today     mild anterior stoop   3-4 en-bloc turn     3-4 steps to recover from pull-test      Laboratory/Radiological:  - Results:  No visits with results within 3 Month(s) from this visit.   Latest known visit with results is:   Lab Visit on 11/21/2022   Component Date Value Ref Range Status    Iron 11/21/2022 93  45 - 160 ug/dL Final    Transferrin 11/21/2022 245  200 - 375 mg/dL Final    TIBC 11/21/2022 363  250 - 450 ug/dL Final    Saturated Iron 11/21/2022 26  20 - 50 % Final    Ferritin 11/21/2022 107  20.0 - 300.0 ng/mL Final       -  Independent review of images:  DaTscan from Dec 2021      - Independent review of consultant's notes:     ASSESSMENT/PLAN:  1. Parkinson's Disease  - typical Parkinson's disease, akinetic rigid, non-motor features of alpha-synucleinopathy such as anosmia and chronic constipation   - currently taking cd/ld 2 tabs TID and 1 mg rasagiline with improvement   - continue BIG and LOUD exercises at home   - some nausea with medication --- > decreased appetite, has lost some weight   - monitor BP at home (both sitting and standing)   - increase water intake, discussed balanced meals -- do not necessarily worry about protein interaction for now unless it is evident that there is decreased absorption       2. Gait instability   - multifactorial, iPD, lumbar spinal stenosis, possible sensory ataxia      3. Insomnia  - continue trazodone       4. Constipation  - continue OTC meds   - increase water intake     5. R foot numbness/tingling   - 2/2 lumbar DDD   - decreased vibratory sense to bilateral LE   - hx of lumbar fusion         F/u with 6 months RBR           Orders Placed This Encounter    carbidopa-levodopa  mg (SINEMET)  mg per tablet    rasagiline (AZILECT) 1 mg Tab             Emmanuel Ahuja MD, MPH  Division of Movement and Memory Disorders  [facility]

## 2023-09-11 ENCOUNTER — TELEPHONE (OUTPATIENT)
Dept: NEUROLOGY | Facility: CLINIC | Age: 73
End: 2023-09-11
Payer: MEDICARE

## 2023-09-11 ENCOUNTER — PATIENT MESSAGE (OUTPATIENT)
Dept: NEUROLOGY | Facility: CLINIC | Age: 73
End: 2023-09-11
Payer: MEDICARE

## 2023-09-11 NOTE — TELEPHONE ENCOUNTER
----- Message from Emilia Triplett sent at 9/11/2023 12:11 PM CDT -----  Regarding: pt advice  Contact: Yasmeen, spouse @[phone]  Caller requesting call back in regards to if this provider has any record of pt being tested for NPH, normal pressure hydrocephalous. Pls call to discuss.

## 2023-09-11 NOTE — TELEPHONE ENCOUNTER
----- Message from Emilia Triplett sent at 9/11/2023 12:11 PM CDT -----  Regarding: pt advice  Contact: Yasmeen, spouse @[phone]  Caller requesting call back in regards to if this provider has any record of pt being tested for NPH, normal pressure hydrocephalous. Pls call to discuss.

## 2023-09-12 ENCOUNTER — TELEPHONE (OUTPATIENT)
Dept: NEUROLOGY | Facility: CLINIC | Age: 73
End: 2023-09-12
Payer: MEDICARE

## 2023-09-12 NOTE — TELEPHONE ENCOUNTER
----- Message from Steveaaronkristan MERRITT Route sent at 9/12/2023  4:45 PM CDT -----  Regarding: Recall Letter  Contact: pt [phone]  Pt received a recall letter to schedule with provider in 6 mos on Feb 27 or shortly after. Pt says he is available on the 27th. Patient Requesting Call Back @ [phone]

## 2023-09-13 NOTE — TELEPHONE ENCOUNTER
Called and spoke to pt and explained schedule is not open yet, but will schedule once open. Patient voiced understanding and had no further questions

## 2023-12-20 NOTE — TELEPHONE ENCOUNTER
----- Message from Da Jeffers sent at 12/20/2023  8:56 AM CST -----  Regarding: Rx refill request today  Contact: @ [phone]  Rx Refill/Request           Is this a Refill or New Rx:  Refill needed today     Rx Name and Strength:  traZODone (DESYREL) 100 MG tablet    Preferred Pharmacy:  [id number] [place] [address] [address] AT [facility] [address]  [facility] [address] [facility] [address]  Phone: [phone] Fax: [phone]    Communication Preference: Call when the script has been sent in to the pharmacy     Additional Information: Pt is needing this filled today before he goes out of town. Pt states that he has 10 pills left but he will be gone for 2 weeks

## 2023-12-27 RX ORDER — TRAZODONE HYDROCHLORIDE 100 MG/1
TABLET ORAL
Qty: 135 TABLET | Refills: 3 | Status: SHIPPED | OUTPATIENT
Start: 2023-12-27

## 2023-12-28 ENCOUNTER — TELEPHONE (OUTPATIENT)
Dept: NEUROLOGY | Facility: CLINIC | Age: 73
End: 2023-12-28
Payer: MEDICARE

## 2023-12-28 NOTE — TELEPHONE ENCOUNTER
----- Message from Rose Acosta sent at 12/28/2023  2:10 PM CST -----  Regarding: Refill  Contact: [place] [phone]  Rx Refill/Request    Is this a Refill      Rx Name and Strength: traZODone (DESYREL) 100 MG tablet 135 tablet     Preferred Pharmacy with phone number:  [facility] #[id number] [facility] [address] AT [facility] [address]  [facility] [address]  [facility] [address]  Phone: [phone] Fax: [phone]       Communication Preference:Call [phone]

## 2024-01-05 ENCOUNTER — TELEPHONE (OUTPATIENT)
Dept: NEUROLOGY | Facility: CLINIC | Age: 74
End: 2024-01-05
Payer: MEDICARE

## 2024-02-01 ENCOUNTER — OFFICE VISIT (OUTPATIENT)
Dept: NEUROLOGY | Facility: CLINIC | Age: 74
End: 2024-02-01
Payer: MEDICARE

## 2024-02-01 DIAGNOSIS — G60.9 HEREDITARY AND IDIOPATHIC NEUROPATHY, UNSPECIFIED: ICD-10-CM

## 2024-02-01 DIAGNOSIS — G20.A1 PARKINSON'S DISEASE WITHOUT DYSKINESIA OR FLUCTUATING MANIFESTATIONS: Primary | ICD-10-CM

## 2024-02-01 DIAGNOSIS — R53.1 WEAKNESS: ICD-10-CM

## 2024-02-01 DIAGNOSIS — G25.89 OTHER SPECIFIED EXTRAPYRAMIDAL AND MOVEMENT DISORDERS: ICD-10-CM

## 2024-02-01 PROCEDURE — 99214 OFFICE O/P EST MOD 30 MIN: CPT | Mod: 95,,, | Performed by: PSYCHIATRY & NEUROLOGY

## 2024-02-01 NOTE — PROGRESS NOTES
"Neurology Telemedicine Note     Name: Ulisses Ponce  MRN: [id number]   CSN: [id number]      Date: 02/01/2024    The patient location is: Home  The chief complaint leading to consultation is: f/u  Visit type: Virtual visit with synchronous audio and video    TOTAL TIME SPENT: 17 min    Each patient to whom I provide medical services by telemedicine is:  (1) informed of the relationship between the physician and patient and the respective role of any other health care provider with respect to management of the patient; and (2) notified that they may decline to receive medical services by telemedicine and may withdraw from such care at any time.    History of Present Illness (HPI):   - was trying NAD and exosomes, no benefit  - still taking cd/ld 2 tabs TID, sometimes forgets  - has prostate CA, getting rad onc at [facility], great experience, planning trip to [place] if needed, planning xrt in couple weeks  - big fall last week, on Eliquis, feel and had big hematoma and now draining into the R leg lower  - feels unable to exercise due to the pain, had PT on Monday and Wed  - has had gradual onset of worsening word finding and memory issues, can't find words, loses train of thought - he feels some jerkiness all over  - wife says he doesn;t make as much eye contact and looks away to "try to find the words"          Nonmotor/Premotor ROS: as per HPI, and all other systems are negative    Past Medical History: The patient  has a past medical history of Hypertension.    Social History: The patient  reports that he has never smoked. He has never used smokeless tobacco. He reports current alcohol use. He reports that he does not currently use drugs.    Family History: Their family history is not on file.    Allergies: Sulfa (sulfonamide antibiotics)     Meds:   Current Outpatient Medications on File Prior to Visit   Medication Sig Dispense Refill    alfuzosin (UROXATRAL) 10 mg Tb24 Pt takes 10mg BID      amLODIPine " (NORVASC) 10 MG tablet Take 10 mg by mouth. Unsure if taking 5mg or 10mg      apixaban (ELIQUIS) 5 mg Tab 2.5 mg 2 (two) times daily.      calcium carbonate-vitamin D3 250-125 mg 250 mg-3.125 mcg (125 unit) Tab Take 1 tablet by mouth.      carbidopa-levodopa  mg (SINEMET)  mg per tablet Take 2 tablets by mouth 3 (three) times daily. 180 tablet 3    cyanocobalamin (VITAMIN B-12) 1000 MCG tablet Take 1,000 mcg by mouth.      folic acid (FOLVITE) 800 MCG Tab Take 800 mcg by mouth.      GLUCOSAMINE SULFATE ORAL Take by mouth.      ibuprofen (ADVIL,MOTRIN) 200 MG tablet Take 200 mg by mouth every 6 (six) hours as needed.      ketoconazole (NIZORAL) 2 % cream ketoconazole 2 % topical cream      ketoconazole (NIZORAL) 2 % shampoo ketoconazole 2 % shampoo   APPLY TO FACE AND EAR. LET SIT FOR 2 MINUTES THEN RINSE DAILY FOR 1 MONTH      losartan (COZAAR) 25 MG tablet       losartan-hydrochlorothiazide 100-25 mg (HYZAAR) 100-25 mg per tablet Take 1 tablet by mouth.      magnesium gluconate, bulk, Powd by Other route.      nebivoloL (BYSTOLIC) 5 MG Tab Take 10 mg by mouth once daily. Pt taking 5mg daily      rasagiline (AZILECT) 1 mg Tab Take 1 tablet (1 mg total) by mouth once daily. 90 tablet 3    terazosin (HYTRIN) 5 MG capsule Take 5 mg by mouth every evening.      traZODone (DESYREL) 100 MG tablet TAKE 1 AND 1/2 TABLETS(150 MG) BY MOUTH EVERY EVENING 135 tablet 3    vitamin D (VITAMIN D3) 1000 units Tab Take 1,000 Units by mouth.       No current facility-administered medications on file prior to visit.       Exam:  Vital Signs deferred with home visit    Constitutional  Well-developed, well-nourished, appears stated age   Eyes  No scleral icterus   ENT  Moist oral mucosa   Cardiovascular  No lower extremity edema    Respiratory  No labored breathing    Skin  No rashes   Hematologic  No bruising   Psychiatric  Normal mood and affect   Other  GI/ deferred    Neurological     * Mental status  Alert and oriented  to person, place, time, and situation; no dysarthria; no aphasia; normal recent and remote memory; follows commands   * Cranial nerves     - CN II  Pupils midposition and symmetric   - CN III, IV, VI  Extraocular movements full, no nystagmus visualized   - CN V  Unable to test   - CN VII  Face strong and symmetric bilaterally    - CN VIII  Hearing grossly intact with conversation    - CN IX, X  Palate raises midline and symmetric    - CN XI  Strong shoulder shrug B    - CN XII  Tongue appears midline    * Motor  Normal bulk by appearance, no drift    * Sensory  Not tested objectively, no changes described by the patient   * Deep tendon reflexes  Not tested   * Coord/Movemt/Gait No hypophonic speech.  No facial masking.  No B bradykinesia.  No tremor with rest, posture, kinesis, or intention.   No chorea, athetosis, dystonia, myoclonus, or tics.   No motor impersistence.  Gait is deferred for safety.       Medical Record Review:  - Lab Results:  No visits with results within 3 Month(s) from this visit.   Latest known visit with results is:   Lab Visit on 11/21/2022   Component Date Value Ref Range Status    Iron 11/21/2022 93  45 - 160 ug/dL Final    Transferrin 11/21/2022 245  200 - 375 mg/dL Final    TIBC 11/21/2022 363  250 - 450 ug/dL Final    Saturated Iron 11/21/2022 26  20 - 50 % Final    Ferritin 11/21/2022 107  20.0 - 300.0 ng/mL Final     ASSESSMENT/PLAN:  1. Parkinson's Disease - more rapid worsening of gait, balance and cognitive features now  - typical Parkinson's disease, akinetic rigid, non-motor features of alpha-synucleinopathy such as anosmia and chronic constipation   - currently taking cd/ld 2 tabs TID and 1 mg rasagiline with improvement   - continue BIG and LOUD exercises at home   - some nausea with medication --- > decreased appetite, has lost some weight   - monitor BP at home (both sitting and standing)   - increase water intake, discussed balanced meals -- do not necessarily worry about  protein interaction for now unless it is evident that there is decreased absorption       1) MRI brain now  2) serologies for reversible causes of dementia         Emmanuel Ahuja MD, MPH  Division of Movement and Memory Disorders  [facility]  [phone]

## 2024-02-07 ENCOUNTER — PATIENT MESSAGE (OUTPATIENT)
Dept: RESEARCH | Facility: HOSPITAL | Age: 74
End: 2024-02-07
Payer: MEDICARE

## 2024-04-10 ENCOUNTER — TELEPHONE (OUTPATIENT)
Dept: NEUROLOGY | Facility: CLINIC | Age: 74
End: 2024-04-10
Payer: MEDICARE

## 2024-04-18 ENCOUNTER — OFFICE VISIT (OUTPATIENT)
Dept: NEUROLOGY | Facility: CLINIC | Age: 74
End: 2024-04-18
Payer: MEDICARE

## 2024-04-18 VITALS
BODY MASS INDEX: 25.02 KG/M2 | SYSTOLIC BLOOD PRESSURE: 104 MMHG | WEIGHT: 169.44 LBS | DIASTOLIC BLOOD PRESSURE: 67 MMHG

## 2024-04-18 DIAGNOSIS — G20.A1 PARKINSON'S DISEASE WITHOUT DYSKINESIA OR FLUCTUATING MANIFESTATIONS: Primary | ICD-10-CM

## 2024-04-18 PROCEDURE — 99214 OFFICE O/P EST MOD 30 MIN: CPT | Mod: S$PBB,,, | Performed by: PSYCHIATRY & NEUROLOGY

## 2024-04-18 PROCEDURE — 99999 PR PBB SHADOW E&M-EST. PATIENT-LVL III: CPT | Mod: PBBFAC,,, | Performed by: PSYCHIATRY & NEUROLOGY

## 2024-04-18 PROCEDURE — 99213 OFFICE O/P EST LOW 20 MIN: CPT | Mod: PBBFAC | Performed by: PSYCHIATRY & NEUROLOGY

## 2024-04-18 PROCEDURE — G2211 COMPLEX E/M VISIT ADD ON: HCPCS | Mod: S$PBB,,, | Performed by: PSYCHIATRY & NEUROLOGY

## 2024-04-18 RX ORDER — ISTRADEFYLLINE 20 MG/1
1 TABLET, FILM COATED ORAL DAILY
Qty: 90 TABLET | Refills: 3 | Status: ACTIVE | OUTPATIENT
Start: 2024-04-18

## 2024-04-18 NOTE — PROGRESS NOTES
Name: Ulisses Ponce  MRN: [id number]   CSN: [id number]      Date: 04/18/2024    Referring physician:  No referring provider defined for this encounter.    Chief Complaint: PD eval     Interval History:  - last seen virtually 2 months ago  - letgs are worse, but still taking meds 2 TID  - MRI was ordered but not done, will do now (likely even worse now)  - more fatigue, attributes some to month of xrt therapy for prostate (but now done)  - using gabapentin and lidocaine cream  - more shoujlder pain        From 2022:  - getting exosomes and NAD via Dr. Jackson  - wife thinks his focus is btter, maybe his gait is getting better  - he admits more stability  - walking for exercise   - no falls   - accompanied by spouse         From Feb 2023  - 1st visit with me  - dx with PD, DatSCAN+, hx of RLS  - on cd/ld 25/100 2 TID and rasagiline  - has side effects of some LH and some sleepiness with each dose for 40 minutes  - has noted some worries about forgetting things as he gets   - discussed   -    From 11/21/22 (RBR)  - right foot, entire foot and up the leg gets numbness and tingling   - occurs whenever he sits down to watch TV    - if he gets up and walks around, it gets better   - feels like his leg is falling asleep    - sometimes feels that it gets better with his PD meds ? Unclear because it happens at random times and not sure whether this is whenever he is due for a dose or not   - also occurs minimally in the L leg   - cant walk log distances, fatigues easily   - does not cross leg often   - follows with Dr. Chugn Reagan for lumbar radiculopathy  - lower right back pain   - B12 on the lower side of normal last visit, now supplementing   - no falls   - doing PT and exercise   - uses walker at night to get up and go to the bathroom    - feels meds kick in after 30 minutes   - some dizziness after he takes a dose of cd/ld   - on bystolic, terazosin, amlodipine, losartan         From Sept 2022  - accompanied by  spouse   - added rasagiline 1 mg daily   - he has noticed that this has helped   - he continues to get numbness/tingling in his R leg and R foot -- some in the left foot as well -- this has been ongoing for a year   - worse at the end of the day  - some low back pain, had lumbar fusion many years ago  - some dizziness whenever he goes from sitting to standing, he checks his BP whenever he is seated and its normal   - does not drink enough water a day -- drinks a couple bottles   - constipation is an issue as well   - he has lost some weight ~ 7 lbs since last visit -- just saw PCP, didn't think much of it?  - also having some shortness of breath -- discussed this with PCP as well   - a little bit of loss of appetite but still cleans his plate   - drinks coffee in the morning and then doesn't eat until lunch, hasnt been eating as much protein because of cd/ld   - doing BRIOFIT -- maintenance PT therapy   - does PT 2-3 days a week   - feels better after he exercises, sleeps better as well         From June 2022 Ulisses Ponce is a L HANDED 73 y.o. male with a medical issues significant for hx of cervical fusion, HLD, HTN, lumbar stenosis with neurogenic claudication and KARRI who presents for PD eval. Previously followed by Dr. Coley, on cd/ld 25/100 2 tabs TID. [facility]nd opinion with [facility] [place] in Feb 2022.  Accompanied by wife and daughter. He had previously attributed stiffness to cervical and lumbar DDD. For many years he has had stiffness. It wasn't until memory changes started that he went to the neurologist. He first saw Dr. Leone in Fall 2021. Diagnosed with Parkinson's in January 2022. They were told that he had significant retropulsion and were concerned for NPH. Harder for him to get dressed, get his shoes on. R side is stiffer. Slower speed, stride length has shortened as well. He has not noticed any improvement with the cd/ld. Takes it 6, noon and 6 pm. Makes him feel a  little dizzy. No improvement in movements. No falls. Shuffles his feet. Fatigues easily. Tries to stay active, doing PT twice a week and exercises twice weekly at his house as well. Did Big and Loud with [employer]. Has some swaying movements whenever he goes from sitting to standing.   Denies hallucinations/delusions or paranoia. Takes trazodone for sleep which helps. Some difficulty getting out of bed.     Last dose of cd/ld was at noon today. Currently supplementing with B12.     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    IMPRESSION: H&Y Stage III Idiopathic Parkinson's Disease    He presents with his wife having flown in from [place] to visit their daughter who lives in [place]. He saw Dr. Neely yesterday for second opinion on diagnosis of Parkinson Disease. He presents today with me for the Parkinson disease annual Interdisciplinary Clinic at [facility] which includes evaluation from PT, OT, ST. Please see therapy evaluations and recommendations in plan that were fully reviewed with the patient today. In compliance with AAN guidelines, today was the patient's yearly evaluation where the patient was evaluated by PT, OT, and ST, and the patient was assessed for comorbid psychiatric symptoms (completed the HADS-see media tab), cognitive impairment (with appropriate screening test of a MOCA documented below), autonomic dysfunction, sleep disturbances, and fall rate.     MOCA today was 24/30. Orthostatic blood pressures today were positive.    Last year he noticed some minor STM difficulty as well as some gait difficulty, rigidity, and  difficulty with getting out of bed. They deny evidence of dream enactment behaviors but he reports loosing his sense of smell 5-10 years ago. He has also dealt with intermittent constipation for years but now uses fiber and stool softeners. Occasionally feels lightheaded on standing but denies significant bladder dysfunction. He denies antipsychotic exposures and there is no relevant family history. No significant hallucinations, staring spells, cognitive fluctuations.  The neurologic examination is notable for parkinsonism given bilateral but slightly asymmetric (R>L) bradykinesia, rigidity and gait changes.   There were no red flag findings by history or on examination concerning for a Parkinson's plus syndrome. Given this, age of onset and gradual progression, this most likely represents idiopathic Parkinson's disease. He underwent a recent DaTscan which by report showed findings consistent with parkinsonism.  He was somewhat recently started on carbidopa-levodopa  mg IR 1 tablet, 3 times a day and notes no significant subjective benefit. Based on his examination, he would likely benefit from an increased dose of carbidopa-levodopa. He increased from 1 tab TID to 2 tabs TID and noticed a little nausea and dizziness. We discussed that this may have been too quick of an increase and recommend going to 1.5 tabs TID for a week then if tolerating ok can go to 2 tabs TID. If any SE occur, may need to increase even slower and they can let us know.     Nonmotor symptoms include mild cognitive impairment, orthostatic hypotension, and hypophonia. We discussed all of these in detail as below.  We also spent time reviewing his medications. He has an extensive list of supplements that he takes, many of which I have never heard before. We discussed that the only supplements that we recommend with Parkinson's disease are B12, folic acid, and frequently vitamin D depending on lab numbers. We will get labs today to assess  these values. The rest of his supplements I recommend he review with his primary care provider or his nutritional list to see which of he can get off of as he likely does not need all of these.      Nonmotor/Premotor ROS:  Anosmia: lost sense of smell 2-3 years  Dysarthria/Hypophonia: softening of his voice, a bit raspy  Dysphagia/Sialorrhea: some difficulty with swallowing pills, no sialorrhea   Depression: none   Cognitive slowing: short term memory issues, loses train of thought   Urinary changes: some leakage, 2/2 BPH   Constipation: chronic constipation, takes OTC supplements, eat prunes   Orthostasis: some with cd/ld   Falls: none   Freezing: none   Micrographia: normal   Sleep issues:  -RBD: none       Review of Systems:   Review of Systems   Constitutional:  Negative for chills, fever and malaise/fatigue.   HENT:  Negative for hearing loss.    Eyes:  Negative for blurred vision and double vision.   Respiratory:  Negative for cough, shortness of breath and stridor.    Cardiovascular:  Negative for chest pain and leg swelling.   Gastrointestinal:  Positive for constipation. Negative for diarrhea and nausea.   Genitourinary:  Negative for frequency and urgency.   Musculoskeletal:  Negative for falls.   Skin:  Negative for itching and rash.   Neurological:  Negative for dizziness, tremors, loss of consciousness and weakness.   Psychiatric/Behavioral:  Positive for memory loss. Negative for hallucinations.            Past Medical History: The patient  has a past medical history of Hypertension.    Social History: The patient  reports that he has never smoked. He has never used smokeless tobacco. He reports current alcohol use. He reports that he does not currently use drugs.    Family History: Their family history is not on file.    Allergies: Sulfa (sulfonamide antibiotics)     Meds:   Current Outpatient Medications on File Prior to Visit   Medication Sig Dispense Refill    alfuzosin (UROXATRAL) 10 mg Tb24 Pt  takes 10mg BID      amLODIPine (NORVASC) 10 MG tablet Take 10 mg by mouth. Unsure if taking 5mg or 10mg      apixaban (ELIQUIS) 5 mg Tab 2.5 mg 2 (two) times daily.      calcium carbonate-vitamin D3 250-125 mg 250 mg-3.125 mcg (125 unit) Tab Take 1 tablet by mouth.      carbidopa-levodopa  mg (SINEMET)  mg per tablet Take 2 tablets by mouth 3 (three) times daily. 180 tablet 3    folic acid (FOLVITE) 800 MCG Tab Take 800 mcg by mouth.      GLUCOSAMINE SULFATE ORAL Take by mouth.      ketoconazole (NIZORAL) 2 % cream ketoconazole 2 % topical cream      ketoconazole (NIZORAL) 2 % shampoo ketoconazole 2 % shampoo   APPLY TO FACE AND EAR. LET SIT FOR 2 MINUTES THEN RINSE DAILY FOR 1 MONTH      losartan (COZAAR) 25 MG tablet       losartan-hydrochlorothiazide 100-25 mg (HYZAAR) 100-25 mg per tablet Take 1 tablet by mouth.      magnesium gluconate, bulk, Powd by Other route.      rasagiline (AZILECT) 1 mg Tab Take 1 tablet (1 mg total) by mouth once daily. 90 tablet 3    terazosin (HYTRIN) 5 MG capsule Take 5 mg by mouth every evening.      traZODone (DESYREL) 100 MG tablet TAKE 1 AND 1/2 TABLETS(150 MG) BY MOUTH EVERY EVENING 135 tablet 3    vitamin D (VITAMIN D3) 1000 units Tab Take 1,000 Units by mouth.      cyanocobalamin (VITAMIN B-12) 1000 MCG tablet Take 1,000 mcg by mouth.      ibuprofen (ADVIL,MOTRIN) 200 MG tablet Take 200 mg by mouth every 6 (six) hours as needed. (Patient not taking: Reported on 4/18/2024)      nebivoloL (BYSTOLIC) 5 MG Tab Take 10 mg by mouth once daily. Pt taking 5mg daily       No current facility-administered medications on file prior to visit.       Exam:  /67 (BP Location: Left arm, Patient Position: Sitting, BP Method: Medium (Automatic))   Wt 76.9 kg (169 lb 6.8 oz)   BMI 25.02 kg/m²     Constitutional  Well-developed, well-nourished, appears stated age   Ophthalmoscopic  No papilledema with no hemorrhages or exudates bilaterally   Cardiovascular  Radial pulses 2+  and symmetric, no LE edema bilaterally   Neurological    * Mental status  MOCA =      - Orientation  Oriented to person, place, time, and situation     - Memory   Intact recent and remote     - Attention/concentration  Attentive, vigilant during exam     - Language  Naming & repetition intact, +2-step commands     - Fund of knowledge  Aware of current events     - Executive  Well-organized thoughts     - Other     * Cranial nerves       - CN II  PERRL, visual fields full to confrontation     - CN III, IV, VI  Extraocular movements full, normal pursuits and saccades     - CN V  Sensation V1 - V3 intact     - CN VII  Face strong and symmetric bilaterally     - CN VIII  Hearing intact bilaterally     - CN IX, X  Palate raises midline and symmetric     - CN XI  SCM and trapezius 5/5 bilaterally     - CN XII  Tongue midline   * Motor  Slightly decreased muscle bulk at bilateral calves   strength 5/5 throughout    4-/5 internally rotating foot    * Sensory   Intact to temperature and light touch, decreased vibratory sense to bilateral knees   * Coordination  No dysmetria with finger-to-nose or heel-to-shin   * Gait  See below.   * Deep tendon reflexes  1+ and symmetric throughout   Babinski downgoing bilaterally   * Specialized movement exam  mild hypophonic speech.    mild facial masking.   R > L cogwheel rigidity.     R > L  bradykinesia.   mild postural tremor of R hand, no tremor at rest    No other dystonia, chorea, athetosis, myoclonus, or tics.   No motor impersistence.   wider-based gait.   mild shortened stride length.   decreased arm swing in R compared to L but relatively good arm swing in clinic today     mild anterior stoop   3-4 en-bloc turn     3-4 steps to recover from pull-test      Laboratory/Radiological:  - Results:  No visits with results within 3 Month(s) from this visit.   Latest known visit with results is:   Lab Visit on 11/21/2022   Component Date Value Ref Range Status    Iron 11/21/2022 93  45  - 160 ug/dL Final    Transferrin 11/21/2022 245  200 - 375 mg/dL Final    TIBC 11/21/2022 363  250 - 450 ug/dL Final    Saturated Iron 11/21/2022 26  20 - 50 % Final    Ferritin 11/21/2022 107  20.0 - 300.0 ng/mL Final       - Independent review of images:  DaTscan from Dec 2021      - Independent review of consultant's notes:     ASSESSMENT/PLAN:  1. Parkinson's Disease  - typical Parkinson's disease, akinetic rigid, non-motor features of alpha-synucleinopathy such as anosmia and chronic constipation   - currently taking cd/ld 2 tabs TID and 1 mg rasagiline with improvement   - continue BIG and LOUD exercises at home   - some nausea with medication --- > decreased appetite, has lost some weight   - monitor BP at home (both sitting and standing)   - increase water intake, discussed balanced meals -- do not necessarily worry about protein interaction for now unless it is evident that there is decreased absorption   - adding Nourianz for motor and gait 4/22/2024      2. Gait instability   - multifactorial, iPD, lumbar spinal stenosis, possible sensory ataxia      3. Insomnia  - continue trazodone       4. Constipation  - continue OTC meds   - increase water intake     5. R foot numbness/tingling   - 2/2 lumbar DDD   - decreased vibratory sense to bilateral LE   - hx of lumbar fusion         F/u with 6 months RBR       This is a patient with a serious and complex neurologic diagnosis whose overall, ongoing care is being managed and monitored by me and our Neurology clinic.   As such, since 2024,  is the appropriate add-on code to accompany the other E/M billing for this visit.                   Emmanuel Ahuja MD, MPH  Division of Movement and Memory Disorders  [facility]

## 2024-05-22 ENCOUNTER — PATIENT MESSAGE (OUTPATIENT)
Dept: NEUROLOGY | Facility: CLINIC | Age: 74
End: 2024-05-22
Payer: MEDICARE

## 2024-06-06 ENCOUNTER — PATIENT MESSAGE (OUTPATIENT)
Dept: NEUROLOGY | Facility: CLINIC | Age: 74
End: 2024-06-06
Payer: MEDICARE

## 2024-06-07 RX ORDER — CARBIDOPA AND LEVODOPA 25; 100 MG/1; MG/1
2 TABLET ORAL 3 TIMES DAILY
Qty: 180 TABLET | Refills: 3 | Status: SHIPPED | OUTPATIENT
Start: 2024-06-07

## 2024-06-14 ENCOUNTER — TELEPHONE (OUTPATIENT)
Dept: NEUROLOGY | Facility: CLINIC | Age: 74
End: 2024-06-14
Payer: MEDICARE

## 2024-06-14 NOTE — TELEPHONE ENCOUNTER
----- Message from Maryan Garcia sent at 6/14/2024 11:27 AM CDT -----  Regarding: pt advice  Contact: pt [phone]  Lulu/spouse calling to advise registration is closed for the 16th Annual Parkinson's Disease Symposium  , was unable to register online. Would like to know if Dr. Tucker can assist patient with still getting in to the symposium. Pls call     Pt needs registration for 2

## 2024-06-26 ENCOUNTER — PATIENT MESSAGE (OUTPATIENT)
Dept: NEUROLOGY | Facility: CLINIC | Age: 74
End: 2024-06-26
Payer: MEDICARE

## 2024-06-26 DIAGNOSIS — G20.A1 PARKINSON'S DISEASE WITHOUT DYSKINESIA OR FLUCTUATING MANIFESTATIONS: ICD-10-CM

## 2024-06-27 RX ORDER — ISTRADEFYLLINE 40 MG/1
1 TABLET, FILM COATED ORAL DAILY
Qty: 90 TABLET | Refills: 3 | Status: ACTIVE | OUTPATIENT
Start: 2024-06-27

## 2024-06-27 NOTE — TELEPHONE ENCOUNTER
Request rec'd from patient and wife to increase Nourianz to 40mg daily. Spoke with NELLY at symposium and need financial assistance. Pended 40mg dose.

## 2024-07-01 DIAGNOSIS — G20.A1 PARKINSON'S DISEASE WITHOUT DYSKINESIA OR FLUCTUATING MANIFESTATIONS: ICD-10-CM

## 2024-07-01 RX ORDER — ISTRADEFYLLINE 20 MG/1
1 TABLET, FILM COATED ORAL DAILY
Qty: 90 TABLET | Refills: 3 | Status: CANCELLED | OUTPATIENT
Start: 2024-07-01

## 2024-08-27 ENCOUNTER — TELEPHONE (OUTPATIENT)
Dept: NEUROLOGY | Facility: CLINIC | Age: 74
End: 2024-08-27
Payer: MEDICARE

## 2024-08-27 NOTE — TELEPHONE ENCOUNTER
----- Message from Rylie Tucker PA-C sent at 8/27/2024  8:12 AM CDT -----  Regarding: RE: Nourianz  Can you call them and have them go back down to 20?  ----- Message -----  From: Esmer Brasher MA  Sent: 8/26/2024   4:09 PM CDT  To: Rylie Tucker PA-C  Subject: FW: Nourianz                                       ----- Message -----  From: Lizeth La, Ángela  Sent: 8/26/2024   4:00 PM CDT  To: Emmanuel Ahuja MD; #  Subject: Nourianz                                         Good afternoon Rylie Gore and Staff,    The patient reported he has been shaky since starting the Nourianz 40 mg and would like to discuss with you. Please follow up at your earliest convenience.    Lizeth Bruce, PharmD  Clinical Pharmacist  [facility]  (P): [phone]  (F): [phone]

## 2024-08-27 NOTE — TELEPHONE ENCOUNTER
Left message for patient that Rylie recommends going back to Nourianz 20mg daily. Asked that he reach out and give me a call back to discuss.

## 2024-08-28 DIAGNOSIS — G20.A1 PARKINSON'S DISEASE WITHOUT DYSKINESIA OR FLUCTUATING MANIFESTATIONS: Primary | ICD-10-CM

## 2024-08-28 RX ORDER — ISTRADEFYLLINE 20 MG/1
1 TABLET, FILM COATED ORAL DAILY
Qty: 30 TABLET | Refills: 11 | Status: ACTIVE | OUTPATIENT
Start: 2024-08-28

## 2024-08-28 NOTE — TELEPHONE ENCOUNTER
Spoke to pt. He is not sure whether he has Nourianz 20mg QD on hand. Still c/o dizziness. Has been monitoring his Bp daily. Unable to provide me the #s, even though he has recorded them.   He did take the Nourianz 40mg today. Spoke to patient's friend-Emmanuel. Notes that pt is   having trouble getting his words out. But this is chronic as he has some cognitive Impairment.  Advised to please stop Nourianz 40mg QD.   Has already taken todays dose. Recommended that he lower this evening's sinemet dose to 1 tab.   Emmanuel verbalized understanding.  Requests call back later when pt wife is home.   Will ask if Charlotte can call them to f/u.

## 2024-08-28 NOTE — TELEPHONE ENCOUNTER
----- Message from Kaylin Moreira sent at 2024 11:57 AM CDT -----  Regarding: PT ADVICE  Contact: [phone]  Pt is returning a missed call from someone in the office and is asking for a return call back soon. Thanks.     Reason for call:MISS CALL      Patient's DX:     Patient requesting call back or [facility] [phone]

## 2024-09-06 RX ORDER — CARBIDOPA AND LEVODOPA 25; 100 MG/1; MG/1
2 TABLET ORAL 3 TIMES DAILY
Qty: 540 TABLET | Refills: 3 | Status: SHIPPED | OUTPATIENT
Start: 2024-09-06

## 2024-09-26 RX ORDER — RASAGILINE 1 MG/1
1 TABLET ORAL
Qty: 90 TABLET | Refills: 3 | Status: SHIPPED | OUTPATIENT
Start: 2024-09-26

## 2024-09-27 ENCOUNTER — PATIENT MESSAGE (OUTPATIENT)
Dept: NEUROLOGY | Facility: CLINIC | Age: 74
End: 2024-09-27
Payer: MEDICARE

## 2024-10-28 ENCOUNTER — PATIENT MESSAGE (OUTPATIENT)
Dept: NEUROLOGY | Facility: CLINIC | Age: 74
End: 2024-10-28
Payer: MEDICARE

## 2024-11-06 ENCOUNTER — TELEPHONE (OUTPATIENT)
Dept: NEUROLOGY | Facility: CLINIC | Age: 74
End: 2024-11-06
Payer: MEDICARE

## 2024-11-06 NOTE — TELEPHONE ENCOUNTER
Called and spoke with Mrs Arana.  Appt added to the  wait list.  So far no sooner appt. Mrs Arana was ok with this.

## 2024-11-06 NOTE — TELEPHONE ENCOUNTER
----- Message from Esther sent at 11/6/2024 10:49 AM CST -----  Type:  Sooner Appointment Request    Caller is requesting a sooner appointment.  Caller declined first available appointment listed below.  Caller will not accept being placed on the waitlist and is requesting a message be sent to doctor.    Name of Caller:Pt's wife Yasmeen  When is the first available appointment?  Symptoms:Parkinson's Disease  Would the patient rather a call back or a response via [facility]? Call  Best Call Back Number: [phone]  Additional Information: pt scheduled for 12/2 with Rylie Tucker, but would like to be seen sooner if possible with either provider.

## 2024-12-02 ENCOUNTER — OFFICE VISIT (OUTPATIENT)
Dept: NEUROLOGY | Facility: CLINIC | Age: 74
End: 2024-12-02
Payer: MEDICARE

## 2024-12-02 ENCOUNTER — LAB VISIT (OUTPATIENT)
Dept: LAB | Facility: HOSPITAL | Age: 74
End: 2024-12-02
Attending: PSYCHIATRY & NEUROLOGY
Payer: MEDICARE

## 2024-12-02 VITALS
DIASTOLIC BLOOD PRESSURE: 63 MMHG | HEIGHT: 69 IN | HEART RATE: 68 BPM | WEIGHT: 163 LBS | SYSTOLIC BLOOD PRESSURE: 134 MMHG | BODY MASS INDEX: 24.14 KG/M2

## 2024-12-02 DIAGNOSIS — G20.A1 PARKINSON'S DISEASE WITHOUT DYSKINESIA OR FLUCTUATING MANIFESTATIONS: ICD-10-CM

## 2024-12-02 DIAGNOSIS — M54.16 LUMBAR RADICULOPATHY: ICD-10-CM

## 2024-12-02 DIAGNOSIS — G25.71 RESTLESS MOVEMENT DISORDER: ICD-10-CM

## 2024-12-02 DIAGNOSIS — R26.81 GAIT INSTABILITY: ICD-10-CM

## 2024-12-02 DIAGNOSIS — G62.9 PERIPHERAL POLYNEUROPATHY: ICD-10-CM

## 2024-12-02 DIAGNOSIS — M48.062 LUMBAR STENOSIS WITH NEUROGENIC CLAUDICATION: ICD-10-CM

## 2024-12-02 DIAGNOSIS — E61.1 IRON DEFICIENCY ASSOCIATED WITH NONFAMILIAL RESTLESS LEGS SYNDROME: ICD-10-CM

## 2024-12-02 DIAGNOSIS — G25.89 OTHER SPECIFIED EXTRAPYRAMIDAL AND MOVEMENT DISORDERS: ICD-10-CM

## 2024-12-02 DIAGNOSIS — G25.81 IRON DEFICIENCY ASSOCIATED WITH NONFAMILIAL RESTLESS LEGS SYNDROME: ICD-10-CM

## 2024-12-02 DIAGNOSIS — G20.A1 PARKINSON'S DISEASE WITHOUT DYSKINESIA OR FLUCTUATING MANIFESTATIONS: Primary | ICD-10-CM

## 2024-12-02 DIAGNOSIS — Z71.89 COUNSELING REGARDING GOALS OF CARE: ICD-10-CM

## 2024-12-02 DIAGNOSIS — G60.9 HEREDITARY AND IDIOPATHIC NEUROPATHY, UNSPECIFIED: ICD-10-CM

## 2024-12-02 LAB
CREAT SERPL-MCNC: 0.9 MG/DL (ref 0.5–1.4)
EST. GFR  (NO RACE VARIABLE): >60 ML/MIN/1.73 M^2
FERRITIN SERPL-MCNC: 73 NG/ML (ref 20–300)
IRON SERPL-MCNC: 104 UG/DL (ref 45–160)
SATURATED IRON: 34 % (ref 20–50)
TOTAL IRON BINDING CAPACITY: 306 UG/DL (ref 250–450)
TRANSFERRIN SERPL-MCNC: 207 MG/DL (ref 200–375)

## 2024-12-02 PROCEDURE — 36415 COLL VENOUS BLD VENIPUNCTURE: CPT | Performed by: PHYSICIAN ASSISTANT

## 2024-12-02 PROCEDURE — 99214 OFFICE O/P EST MOD 30 MIN: CPT | Mod: S$PBB,,, | Performed by: PHYSICIAN ASSISTANT

## 2024-12-02 PROCEDURE — 82565 ASSAY OF CREATININE: CPT | Performed by: PSYCHIATRY & NEUROLOGY

## 2024-12-02 PROCEDURE — G2211 COMPLEX E/M VISIT ADD ON: HCPCS | Mod: S$PBB,,, | Performed by: PHYSICIAN ASSISTANT

## 2024-12-02 PROCEDURE — 83540 ASSAY OF IRON: CPT | Performed by: PHYSICIAN ASSISTANT

## 2024-12-02 PROCEDURE — 99215 OFFICE O/P EST HI 40 MIN: CPT | Mod: PBBFAC | Performed by: PHYSICIAN ASSISTANT

## 2024-12-02 PROCEDURE — 82728 ASSAY OF FERRITIN: CPT | Performed by: PHYSICIAN ASSISTANT

## 2024-12-02 PROCEDURE — 99999 PR PBB SHADOW E&M-EST. PATIENT-LVL V: CPT | Mod: PBBFAC,,, | Performed by: PHYSICIAN ASSISTANT

## 2024-12-02 RX ORDER — PREGABALIN 25 MG/1
CAPSULE ORAL
Qty: 60 CAPSULE | Refills: 3 | Status: SHIPPED | OUTPATIENT
Start: 2024-12-02 | End: 2025-12-16

## 2024-12-02 NOTE — PROGRESS NOTES
Name: Ulisses Ponce  MRN: [id number]   CSN: [id number]      Date: 12/02/2024    Referring physician:  No referring provider defined for this encounter.    Chief Complaint: PD     Interval History:  - had watchman for afib   - 2 tabs TID -- 6-12-6, doesn't always take it on time   - rasagiline and nourianz 20 mg   - here with spouse and friend (OT) who is going to do exercises with him at home   - pain in his legs whenever he is sitting, moves and it goes away  - purposely needs to move legs   - gabapentin caused memory loss    - does not recall taking lyrica   - supplementing with b12   - taking otc supplement for constipation which is helpful   - mostly has 1-2 BM daily   - rocks back and forth whenever he goes from sitting to standing   - loses train of thought when talking, sometimes comes back to him   - on O2 at night, saw pulmonology/lifestyle physician who rec'd O2 at night  - feels like he sleeps better whenever he uses it   - going to [place]         April 2024  - last seen virtually 2 months ago  - letgs are worse, but still taking meds 2 TID  - MRI was ordered but not done, will do now (likely even worse now)  - more fatigue, attributes some to month of xrt therapy for prostate (but now done)  - using gabapentin and lidocaine cream  - more shoujlder pain        From 2022:  - getting exosomes and NAD via Dr. Jackson  - wife thinks his focus is btter, maybe his gait is getting better  - he admits more stability  - walking for exercise   - no falls   - accompanied by spouse         From Feb 2023  - 1st visit with me  - dx with PD, DatSCAN+, hx of RLS  - on cd/ld 25/100 2 TID and rasagiline  - has side effects of some LH and some sleepiness with each dose for 40 minutes  - has noted some worries about forgetting things as he gets   - discussed   -    From 11/21/22 (RBR)  - right foot, entire foot and up the leg gets numbness and tingling   - occurs whenever he sits down to watch TV    - if he gets up  and walks around, it gets better   - feels like his leg is falling asleep    - sometimes feels that it gets better with his PD meds ? Unclear because it happens at random times and not sure whether this is whenever he is due for a dose or not   - also occurs minimally in the L leg   - cant walk log distances, fatigues easily   - does not cross leg often   - follows with Dr. Chung Reagan for lumbar radiculopathy  - lower right back pain   - B12 on the lower side of normal last visit, now supplementing   - no falls   - doing PT and exercise   - uses walker at night to get up and go to the bathroom    - feels meds kick in after 30 minutes   - some dizziness after he takes a dose of cd/ld   - on bystolic, terazosin, amlodipine, losartan         From Sept 2022  - accompanied by spouse   - added rasagiline 1 mg daily   - he has noticed that this has helped   - he continues to get numbness/tingling in his R leg and R foot -- some in the left foot as well -- this has been ongoing for a year   - worse at the end of the day  - some low back pain, had lumbar fusion many years ago  - some dizziness whenever he goes from sitting to standing, he checks his BP whenever he is seated and its normal   - does not drink enough water a day -- drinks a couple bottles   - constipation is an issue as well   - he has lost some weight ~ 7 lbs since last visit -- just saw PCP, didn't think much of it?  - also having some shortness of breath -- discussed this with PCP as well   - a little bit of loss of appetite but still cleans his plate   - drinks coffee in the morning and then doesn't eat until lunch, hasnt been eating as much protein because of cd/ld   - doing BRIOFIT -- maintenance PT therapy   - does PT 2-3 days a week   - feels better after he exercises, sleeps better as well         From June 2022 Ulisses Ponce is a L HANDED 73 y.o. male with a medical issues significant for hx of cervical fusion, HLD, HTN, lumbar stenosis with  neurogenic claudication and KARRI who presents for PD eval. Previously followed by Dr. Coley, on cd/ld 25/100 2 tabs TID. [facility]nd opinion with [facility] [place] in Feb 2022.  Accompanied by wife and daughter. He had previously attributed stiffness to cervical and lumbar DDD. For many years he has had stiffness. It wasn't until memory changes started that he went to the neurologist. He first saw Dr. Leone in Fall 2021. Diagnosed with Parkinson's in January 2022. They were told that he had significant retropulsion and were concerned for NPH. Harder for him to get dressed, get his shoes on. R side is stiffer. Slower speed, stride length has shortened as well. He has not noticed any improvement with the cd/ld. Takes it 6, noon and 6 pm. Makes him feel a little dizzy. No improvement in movements. No falls. Shuffles his feet. Fatigues easily. Tries to stay active, doing PT twice a week and exercises twice weekly at his house as well. Did Big and Loud with [employer]. Has some swaying movements whenever he goes from sitting to standing.   Denies hallucinations/delusions or paranoia. Takes trazodone for sleep which helps. Some difficulty getting out of bed.     Last dose of cd/ld was at noon today. Currently supplementing with B12.     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    IMPRESSION: H&Y Stage III Idiopathic Parkinson's Disease    He presents with his wife having flown in from [place] to visit their daughter who lives in [place]. He saw Dr. Neely yesterday for second opinion on diagnosis of Parkinson Disease. He presents today with me for the  Parkinson disease annual Interdisciplinary Clinic at [facility] which includes evaluation from PT, OT, ST. Please see therapy evaluations and recommendations in plan that were fully reviewed with the patient today. In compliance with AAN guidelines, today was the patient's yearly evaluation where the patient was evaluated by PT, OT, and ST, and the patient was assessed for comorbid psychiatric symptoms (completed the HADS-see media tab), cognitive impairment (with appropriate screening test of a MOCA documented below), autonomic dysfunction, sleep disturbances, and fall rate.     MOCA today was 24/30. Orthostatic blood pressures today were positive.    Last year he noticed some minor STM difficulty as well as some gait difficulty, rigidity, and difficulty with getting out of bed. They deny evidence of dream enactment behaviors but he reports loosing his sense of smell 5-10 years ago. He has also dealt with intermittent constipation for years but now uses fiber and stool softeners. Occasionally feels lightheaded on standing but denies significant bladder dysfunction. He denies antipsychotic exposures and there is no relevant family history. No significant hallucinations, staring spells, cognitive fluctuations.  The neurologic examination is notable for parkinsonism given bilateral but slightly asymmetric (R>L) bradykinesia, rigidity and gait changes.   There were no red flag findings by history or on examination concerning for a Parkinson's plus syndrome. Given this, age of onset and gradual progression, this most likely represents idiopathic Parkinson's disease. He underwent a recent DaTscan which by report showed findings consistent with parkinsonism.  He was somewhat recently started on carbidopa-levodopa  mg IR 1 tablet, 3 times a day and notes no significant subjective benefit. Based on his examination, he would likely benefit from an increased dose of carbidopa-levodopa. He increased  from 1 tab TID to 2 tabs TID and noticed a little nausea and dizziness. We discussed that this may have been too quick of an increase and recommend going to 1.5 tabs TID for a week then if tolerating ok can go to 2 tabs TID. If any SE occur, may need to increase even slower and they can let us know.     Nonmotor symptoms include mild cognitive impairment, orthostatic hypotension, and hypophonia. We discussed all of these in detail as below.  We also spent time reviewing his medications. He has an extensive list of supplements that he takes, many of which I have never heard before. We discussed that the only supplements that we recommend with Parkinson's disease are B12, folic acid, and frequently vitamin D depending on lab numbers. We will get labs today to assess these values. The rest of his supplements I recommend he review with his primary care provider or his nutritional list to see which of he can get off of as he likely does not need all of these.      Nonmotor/Premotor ROS:  Anosmia: lost sense of smell 2-3 years  Dysarthria/Hypophonia: softening of his voice, a bit raspy  Dysphagia/Sialorrhea: some difficulty with swallowing pills, no sialorrhea   Depression: none   Cognitive slowing: short term memory issues, loses train of thought   Urinary changes: some leakage, 2/2 BPH   Constipation: chronic constipation, takes OTC supplements, eat prunes   Orthostasis: some with cd/ld   Falls: none   Freezing: none   Micrographia: normal   Sleep issues:  -RBD: none       Review of Systems:   Review of Systems   Constitutional:  Negative for chills, fever and malaise/fatigue.   HENT:  Negative for hearing loss.    Eyes:  Negative for blurred vision and double vision.   Respiratory:  Negative for cough, shortness of breath and stridor.    Cardiovascular:  Negative for chest pain and leg swelling.   Gastrointestinal:  Positive for constipation. Negative for diarrhea and nausea.   Genitourinary:  Negative for frequency  and urgency.   Musculoskeletal:  Negative for falls.   Skin:  Negative for itching and rash.   Neurological:  Negative for dizziness, tremors, loss of consciousness and weakness.   Psychiatric/Behavioral:  Positive for memory loss. Negative for hallucinations.            Past Medical History: The patient  has a past medical history of Hypertension.    Social History: The patient  reports that he has never smoked. He has never used smokeless tobacco. He reports current alcohol use. He reports that he does not currently use drugs.    Family History: Their family history is not on file.    Allergies: Patient has no known allergies.     Meds:   Current Outpatient Medications on File Prior to Visit   Medication Sig Dispense Refill    alfuzosin (UROXATRAL) 10 mg Tb24 Pt takes 10mg BID      alpha lipoic acid 300 mg Cap Take 1 capsule by mouth once daily.      amLODIPine (NORVASC) 10 MG tablet Take 10 mg by mouth. Unsure if taking 5mg or 10mg      apixaban (ELIQUIS) 5 mg Tab 2.5 mg 2 (two) times daily.      CALCIUM ACETATE ORAL Take 1,100 mg by mouth once daily.      carbidopa-levodopa  mg (SINEMET)  mg per tablet TAKE 2 TABLETS BY MOUTH THREE TIMES DAILY 540 tablet 3    cyanocobalamin (VITAMIN B-12) 1000 MCG tablet Take 100 mcg by mouth once daily.      docusate sodium (COLACE) 100 MG capsule Take 100 mg by mouth once daily.      folic acid (FOLVITE) 800 MCG Tab Take 800 mcg by mouth once daily.      GLUCOSAMINE HCL-VITAMIN D3 ORAL Take 1 tablet by mouth once daily.      GLYCINE ORAL Take 125 mg by mouth every evening.      ibuprofen (ADVIL,MOTRIN) 200 MG tablet Take 200 mg by mouth every 6 (six) hours as needed.      istradefylline (NOURIANZ) 20 mg Tab Take 1 tablet by mouth once daily. 30 tablet 11    ketoconazole (NIZORAL) 2 % cream Apply 1 Application topically as needed.      ketoconazole (NIZORAL) 2 % shampoo Apply 1 Application topically every other day.      Lactobacillus rhamnosus GG (CULTURELLE) 10  "billion cell capsule Take 1 capsule by mouth once daily.      losartan-hydrochlorothiazide 100-25 mg (HYZAAR) 100-25 mg per tablet Take 1 tablet by mouth once daily.      lutein 10 mg Tab Take 1 tablet by mouth once daily.      MAGNESIUM ORAL Take 400 mg by mouth once daily.      melatonin 5 mg Cap Take 1 capsule by mouth every evening.      phenazopyridine (PYRIDIUM) 95 MG tablet Take 95 mg by mouth once daily.      potassium gluconate 595 mg (99 mg) Tab Take 1 tablet by mouth once daily.      PRALUENT PEN 75 mg/mL PnIj Inject 75 mg into the skin every 14 (fourteen) days.      rasagiline (AZILECT) 1 mg Tab TAKE 1 TABLET(1 MG) BY MOUTH EVERY DAY 90 tablet 3    SODIUM SELENITE, BULK, MISC Take 1 tablet by mouth once daily.      TAURINE ORAL Take 150 mg by mouth every evening.      terazosin (HYTRIN) 5 MG capsule Take 5 mg by mouth every evening.      traZODone (DESYREL) 100 MG tablet TAKE 1 AND 1/2 TABLETS(150 MG) BY MOUTH EVERY EVENING 135 tablet 3    vitamin D (VITAMIN D3) 1000 units Tab Take 1,000 Units by mouth.      ZEAXANTHIN, BULK, MISC 2 mg by Misc.(Non-Drug; Combo Route) route once daily.       No current facility-administered medications on file prior to visit.       Exam:  /63 (Patient Position: Sitting)   Pulse 68   Ht 5' 9" (1.753 m)   Wt 73.9 kg (163 lb)   BMI 24.07 kg/m²     Constitutional  Well-developed, well-nourished, appears stated age   Ophthalmoscopic  No papilledema with no hemorrhages or exudates bilaterally   Cardiovascular  Radial pulses 2+ and symmetric, no LE edema bilaterally   Neurological    * Mental status  MOCA =      - Orientation  Oriented to person, place, time, and situation     - Memory   Intact recent and remote     - Attention/concentration  Attentive, vigilant during exam     - Language  Naming & repetition intact, +2-step commands     - Fund of knowledge  Aware of current events     - Executive  Well-organized thoughts     - Other     * Cranial nerves       - CN II  " PERRL, visual fields full to confrontation     - CN III, IV, VI  Extraocular movements full, normal pursuits and saccades     - CN V  Sensation V1 - V3 intact     - CN VII  Face strong and symmetric bilaterally     - CN VIII  Hearing intact bilaterally     - CN IX, X  Palate raises midline and symmetric     - CN XI  SCM and trapezius 5/5 bilaterally     - CN XII  Tongue midline   * Motor  Slightly decreased muscle bulk at bilateral calves   strength 5/5 throughout    4-/5 internally rotating foot    * Sensory   Intact to temperature and light touch, decreased vibratory sense to bilateral knees   * Coordination  No dysmetria with finger-to-nose or heel-to-shin   * Gait  See below.   * Deep tendon reflexes  1+ and symmetric throughout   Babinski downgoing bilaterally   * Specialized movement exam  mild hypophonic speech.    mild facial masking.   R > L cogwheel rigidity.     R > L  bradykinesia.   mild postural tremor of R hand, no tremor at rest    No other dystonia, chorea, athetosis, myoclonus, or tics.   No motor impersistence.   wider-based gait.   mild shortened stride length.   decreased arm swing in R compared to L but relatively good arm swing in clinic today     mild anterior stoop   3-4 en-bloc turn     3-4 steps to recover from pull-test      Laboratory/Radiological:  - Results:  No visits with results within 3 Month(s) from this visit.   Latest known visit with results is:   Lab Visit on 11/21/2022   Component Date Value Ref Range Status    Iron 11/21/2022 93  45 - 160 ug/dL Final    Transferrin 11/21/2022 245  200 - 375 mg/dL Final    TIBC 11/21/2022 363  250 - 450 ug/dL Final    Saturated Iron 11/21/2022 26  20 - 50 % Final    Ferritin 11/21/2022 107  20.0 - 300.0 ng/mL Final       - Independent review of images:  DaTscan from Dec 2021      - Independent review of consultant's notes:     ASSESSMENT/PLAN:  1. Parkinson's Disease  - typical Parkinson's disease, akinetic rigid, non-motor features of  alpha-synucleinopathy such as anosmia and chronic constipation   - currently taking cd/ld 2 tabs TID and 1 mg rasagiline with improvement, nourianz 20 mg   - continue BIG and LOUD exercises at home   - some nausea with medication --- > decreased appetite, has lost some weight   - monitor BP at home (both sitting and standing)   - increase water intake, discussed balanced meals -- do not necessarily worry about protein interaction for now unless it is evident that there is decreased absorption   - adding Nourianz for motor and gait 4/22/2024    Bothersome RLS   - rechecking iron levels and b12  - failed gabapentin in the past  - cautious trial of low dose lyrica 25 mg qhs to start -- may go to BID if it does not cause sleepiness        2. Gait instability   - multifactorial, iPD, lumbar spinal stenosis, possible sensory ataxia      3. Insomnia  - continue trazodone       4. Constipation  - continue OTC meds   - increase water intake     5. R foot numbness/tingling   - 2/2 lumbar DDD   - decreased vibratory sense to bilateral LE   - hx of lumbar fusion         F/u with 6 months RBR       This is a patient with a serious and complex neurologic diagnosis whose overall, ongoing care is being managed and monitored by me and our Neurology clinic.   As such, since 2024,  is the appropriate add-on code to accompany the other E/M billing for this visit.      Orders Placed This Encounter    Vitamin B12 Deficiency Panel    IRON AND TIBC    FERRITIN    pregabalin (LYRICA) 25 MG capsule           Collaborating Physician, Dr. Ahuja, was available during today's encounter. Any change to plan along with cosign to appear in the EMR.       I spent 33 minutes with the patient, reviewing past encounters, labs and imaging.        Rylie Tucker PA-C   [facility]  Department of Neurology  Movement Disorders

## 2024-12-03 LAB — VIT B12 SERPL-MCNC: 1368 NG/L (ref 180–914)

## 2024-12-10 ENCOUNTER — PATIENT MESSAGE (OUTPATIENT)
Dept: NEUROLOGY | Facility: CLINIC | Age: 74
End: 2024-12-10
Payer: MEDICARE

## 2024-12-20 RX ORDER — TRAZODONE HYDROCHLORIDE 100 MG/1
TABLET ORAL
Qty: 135 TABLET | Refills: 3 | Status: SHIPPED | OUTPATIENT
Start: 2024-12-20

## 2024-12-20 NOTE — TELEPHONE ENCOUNTER
His presentation has been rather classic of PD and not NPH and has responded favorably to the PD meds + he has a + DaTscan.   Inpatient Nutrition Evaluation    Admit Date: 12/16/2024   Total duration of encounter: 4 days   Patient Age: 65 y.o.    Nutrition Recommendation/Prescription     Heart Healthy diet  Daily weight    Nutrition Assessment     Chart Review    Reason Seen: follow-up    Malnutrition Screening Tool Results   Have you recently lost weight without trying?: No  Have you been eating poorly because of a decreased appetite?: No   MST Score: 0   Diagnosis:  Acute exacerbation of HFrEF, Moderate MR, Mild to moderate TF, HTN, Afib, Acute LLE pain, Normocytic Anemia    Relevant Medical History: HFrEF, HTN, PVD, HLD, CAD, COPD    Scheduled Medications:  atorvastatin, 80 mg, Daily  cetirizine, 10 mg, Daily  clopidogreL, 75 mg, Daily  folic acid, 1 mg, Daily  furosemide (LASIX) injection, 40 mg, Q12H  LIDOcaine, 2 patch, QHS  metoprolol succinate, 12.5 mg, Daily  nicotine, 1 patch, Daily  pantoprazole, 40 mg, BID  potassium chloride SA, 20 mEq, BID  rivaroxaban, 20 mg, Daily  sacubitriL-valsartan, 1 tablet, BID  spironolactone, 50 mg, Daily    Continuous Infusions:   PRN Medications:   Current Facility-Administered Medications:     acetaminophen, 650 mg, Oral, Q4H PRN    albuterol-ipratropium, 3 mL, Nebulization, Q4H PRN    dextrose 50%, 12.5 g, Intravenous, PRN    dextrose 50%, 25 g, Intravenous, PRN    diclofenac sodium, 2 g, Topical (Top), Nightly PRN    glucagon (human recombinant), 1 mg, Intramuscular, PRN    glucose, 16 g, Oral, PRN    glucose, 24 g, Oral, PRN    melatonin, 6 mg, Oral, Nightly PRN    naloxone, 0.02 mg, Intravenous, PRN    sodium chloride 0.9%, 10 mL, Intravenous, Q12H PRN    Recent Labs   Lab 12/16/24  1313 12/16/24  1348 12/17/24  0505 12/17/24  0506 12/18/24  0402 12/19/24  0903 12/20/24  0558     --  142  --  143 139 141   K 4.1  --  3.3*  --  3.9 3.7 4.1   CALCIUM 9.3  --  9.0  --  9.1 9.6 9.2   PHOS  --   --  4.1  --  3.5 3.4 3.9   MG  --   --  2.10  --  1.90 1.90 1.80   CO2 24  --  27  --  27 27 29    BUN 24.0  --  31.6*  --  25.7 25.4 23.4   CREATININE 1.12  --  1.24  --  1.12 1.02 1.13   EGFRNORACEVR >60  --  >60  --  >60 >60 >60   GLUCOSE 93  --  95  --  99 98 96   BILITOT 1.3  --  1.4  --  1.3 1.9* 1.3   ALKPHOS 120  --  113  --  112 111 105   ALT 20  --  16  --  15 14 13   AST 30  --  20  --  18 18 16   ALBUMIN 4.0  --  3.7  --  3.6 3.8 3.6   WBC  --  6.33  --  6.15 6.40 6.38 6.13   HGB  --  9.9*  --  9.3* 9.2* 9.4* 9.2*   HCT  --  31.4*  --  29.0* 29.4* 29.4* 30.2*     Nutrition Orders:  Diet Heart Healthy Low Sodium,2gm; Fluid - 1500mL      Appetite/Oral Intake: good/% of meals  Factors Affecting Nutritional Intake: none identified  Social Needs Impacting Access to Food: none identified  Food/Advent/Cultural Preferences: none reported  Food Allergies: no known food allergies  Last Bowel Movement: 24  Wound(s):  skin intact    Comments    24 -- Pt continues with good appetite eating 75 - 100% of meals served; weight loss since admit related to fluid, continues to diurese    24 -- Pt reports good appetite however dislike of foods, food preferences obtained with kitchen ; pt denies wt loss reporting gain, UBW appears to be ~205 lb per EMR wt history; encouraged & educated pt on low sodium diet for management of CHF; K (L) -- replace as needed    Anthropometrics    Height: 6' (182.9 cm), Height Method: Stated  Last Weight: 107.8 kg (237 lb 11.2 oz) (24 1157), Weight Method: Bed Scale  BMI (Calculated): 32.2  BMI Classification: obese grade I (BMI 30-34.9)        Ideal Body Weight (IBW), Male: 178 lb     % Ideal Body Weight, Male (lb): 135.56 %                 Usual Body Weight (UBW), k kg  % Usual Body Weight: 116.18     Usual Weight Provided By: EMR weight history    Wt Readings from Last 5 Encounters:   24 107.8 kg (237 lb 11.2 oz)   24 106 kg (233 lb 11 oz)   24 93.7 kg (206 lb 9.6 oz)   24 93 kg (205 lb 0.4 oz)   24 93.4 kg (206  lb)     Weight Change(s) Since Admission: ~4 lb wt loss related to fluid, continues to diurese  Wt Readings from Last 1 Encounters:   12/20/24 1157 107.8 kg (237 lb 11.2 oz)   12/20/24 0800 108.1 kg (238 lb 6.4 oz)   12/19/24 0700 107.6 kg (237 lb 3.4 oz)   12/19/24 0630 107.1 kg (236 lb 3.2 oz)   12/18/24 0605 107 kg (236 lb)   12/17/24 0712 107.8 kg (237 lb 11.2 oz)   12/16/24 1519 107.7 kg (237 lb 6.4 oz)   12/16/24 1205 109.5 kg (241 lb 4.7 oz)   Admit Weight: 109.5 kg (241 lb 4.7 oz) (12/16/24 1205), Weight Method: Standard Scale    Patient Education     Education Provided: low sodium diet  Teaching Method: explanation and printed materials  Comprehension: verbalizes understanding  Barriers to Learning: none evident  Expected Compliance: fair  Comments: All questions were answered and dietitian's contact information was provided.     Nutrition Goals & Monitoring     Dietitian will monitor: food and beverage intake, weight, and food/nutrition knowledge skill  Discharge planning: continue heart healthy diet  Nutrition Risk/Follow-Up: low (follow-up in 5-7 days)  Patients assigned 'low nutrition risk' status do not qualify for a full nutritional assessment but will be monitored and re-evaluated in a 5-7 day time period. Please consult if re-evaluation needed sooner.

## 2025-01-08 ENCOUNTER — PATIENT MESSAGE (OUTPATIENT)
Facility: CLINIC | Age: 75
End: 2025-01-08
Payer: MEDICARE

## 2025-01-31 ENCOUNTER — PATIENT MESSAGE (OUTPATIENT)
Facility: CLINIC | Age: 75
End: 2025-01-31
Payer: MEDICARE

## 2025-02-12 ENCOUNTER — PATIENT MESSAGE (OUTPATIENT)
Facility: CLINIC | Age: 75
End: 2025-02-12
Payer: MEDICARE

## 2025-02-19 ENCOUNTER — PATIENT MESSAGE (OUTPATIENT)
Facility: CLINIC | Age: 75
End: 2025-02-19
Payer: MEDICARE

## 2025-03-06 ENCOUNTER — TELEPHONE (OUTPATIENT)
Facility: CLINIC | Age: 75
End: 2025-03-06
Payer: MEDICARE

## 2025-03-10 ENCOUNTER — RESULTS FOLLOW-UP (OUTPATIENT)
Facility: CLINIC | Age: 75
End: 2025-03-10

## 2025-03-10 ENCOUNTER — OFFICE VISIT (OUTPATIENT)
Facility: CLINIC | Age: 75
End: 2025-03-10
Payer: MEDICARE

## 2025-03-10 ENCOUNTER — HOSPITAL ENCOUNTER (OUTPATIENT)
Dept: RADIOLOGY | Facility: HOSPITAL | Age: 75
Discharge: HOME | End: 2025-03-10
Attending: PHYSICIAN ASSISTANT
Payer: MEDICARE

## 2025-03-10 VITALS
BODY MASS INDEX: 25.18 KG/M2 | HEIGHT: 69 IN | HEART RATE: 69 BPM | WEIGHT: 170 LBS | DIASTOLIC BLOOD PRESSURE: 69 MMHG | SYSTOLIC BLOOD PRESSURE: 115 MMHG

## 2025-03-10 DIAGNOSIS — G25.81 IRON DEFICIENCY ASSOCIATED WITH NONFAMILIAL RESTLESS LEGS SYNDROME: ICD-10-CM

## 2025-03-10 DIAGNOSIS — M54.16 LUMBAR RADICULOPATHY: ICD-10-CM

## 2025-03-10 DIAGNOSIS — G20.A1 PARKINSON'S DISEASE WITHOUT DYSKINESIA OR FLUCTUATING MANIFESTATIONS: Primary | ICD-10-CM

## 2025-03-10 DIAGNOSIS — M48.062 LUMBAR STENOSIS WITH NEUROGENIC CLAUDICATION: ICD-10-CM

## 2025-03-10 DIAGNOSIS — G25.71 RESTLESS MOVEMENT DISORDER: ICD-10-CM

## 2025-03-10 DIAGNOSIS — R26.81 GAIT INSTABILITY: ICD-10-CM

## 2025-03-10 DIAGNOSIS — G20.A1 PARKINSON'S DISEASE WITHOUT DYSKINESIA OR FLUCTUATING MANIFESTATIONS: ICD-10-CM
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
## 2025-03-10 DIAGNOSIS — G60.9 HEREDITARY AND IDIOPATHIC NEUROPATHY, UNSPECIFIED: ICD-10-CM

## 2025-03-10 DIAGNOSIS — G89.29 OTHER CHRONIC PAIN: ICD-10-CM

## 2025-03-10 DIAGNOSIS — Z71.89 COUNSELING REGARDING GOALS OF CARE: ICD-10-CM

## 2025-03-10 DIAGNOSIS — G25.89 OTHER SPECIFIED EXTRAPYRAMIDAL AND MOVEMENT DISORDERS: ICD-10-CM

## 2025-03-10 DIAGNOSIS — G62.9 PERIPHERAL POLYNEUROPATHY: ICD-10-CM

## 2025-03-10 DIAGNOSIS — E61.1 IRON DEFICIENCY ASSOCIATED WITH NONFAMILIAL RESTLESS LEGS SYNDROME: ICD-10-CM

## 2025-03-10 PROCEDURE — 70450 CT HEAD/BRAIN W/O DYE: CPT | Mod: TC

## 2025-03-10 PROCEDURE — 99213 OFFICE O/P EST LOW 20 MIN: CPT | Mod: PBBFAC,25 | Performed by: PHYSICIAN ASSISTANT

## 2025-03-10 PROCEDURE — 99215 OFFICE O/P EST HI 40 MIN: CPT | Mod: S$PBB,,, | Performed by: PHYSICIAN ASSISTANT

## 2025-03-10 PROCEDURE — 99999 PR PBB SHADOW E&M-EST. PATIENT-LVL III: CPT | Mod: PBBFAC,,, | Performed by: PHYSICIAN ASSISTANT

## 2025-03-10 PROCEDURE — 70450 CT HEAD/BRAIN W/O DYE: CPT | Mod: 26,,, | Performed by: STUDENT IN AN ORGANIZED HEALTH CARE EDUCATION/TRAINING PROGRAM

## 2025-03-10 PROCEDURE — G2211 COMPLEX E/M VISIT ADD ON: HCPCS | Mod: S$PBB,,, | Performed by: PHYSICIAN ASSISTANT

## 2025-03-10 RX ORDER — DULOXETIN HYDROCHLORIDE 20 MG/1
20 CAPSULE, DELAYED RELEASE ORAL DAILY
Qty: 30 CAPSULE | Refills: 11 | Status: SHIPPED | OUTPATIENT
Start: 2025-03-10 | End: 2026-03-10

## 2025-03-10 NOTE — PROGRESS NOTES
Name: Ulisses Ponce  MRN: [id number]   CSN: [id number]      Date: 03/10/2025    Referring physician:  No referring provider defined for this encounter.    Chief Complaint: PD     Interval History:  - two falls last night, hit his head   - here with friend and spouse   - neuropathy continues to be an issue, can't feel his feet   - right leg turns red, swollen  -- had ultrasound about a month ago   - more confusion, feeling lightheaded -- whenever he goes from sitting to standing   - wakes up from a nap and doesn't know if it is day or night   - he was not using the walker with either falls    - sleeps in recliner, has some breakdown of skin on his sacrum   - seeing PCP tomorrow   - last emg was 2023   - feels like numbness and tingling has worsened in his legs  - doing exercises 5 days a week   - taking alpha lipoic acid daily, not taking lyrica -- too many side effects, same with gabapentin   - tremors a bit worse in his left hand.   - still taking rasagiline, trazodone   - takes nourianz   - cd/ld 2 tabs TID -- 7 -- 1130 and 5 30   - nourianz and rasagiline in the morning   - not sleeping well, takes trazodone 100 mg qhs   - still supplementing with b12    - weak urinary stream         Dec 2024  - had watchman for afib   - 2 tabs TID -- 6-12-6, doesn't always take it on time   - rasagiline and nourianz 20 mg   - here with spouse and friend (OT) who is going to do exercises with him at home   - pain in his legs whenever he is sitting, moves and it goes away  - purposely needs to move legs   - gabapentin caused memory loss    - does not recall taking lyrica   - supplementing with b12   - taking otc supplement for constipation which is helpful   - mostly has 1-2 BM daily   - rocks back and forth whenever he goes from sitting to standing   - loses train of thought when talking, sometimes comes back to him   - on O2 at night, saw pulmonology/lifestyle physician who rec'd O2 at night  - feels like he sleeps better  whenever he uses it   - going to [place]         April 2024  - last seen virtually 2 months ago  - letgs are worse, but still taking meds 2 TID  - MRI was ordered but not done, will do now (likely even worse now)  - more fatigue, attributes some to month of xrt therapy for prostate (but now done)  - using gabapentin and lidocaine cream  - more shoujlder pain        From 2022:  - getting exosomes and NAD via Dr. Jackson  - wife thinks his focus is btter, maybe his gait is getting better  - he admits more stability  - walking for exercise   - no falls   - accompanied by spouse         From Feb 2023  - 1st visit with me  - dx with PD, DatSCAN+, hx of RLS  - on cd/ld 25/100 2 TID and rasagiline  - has side effects of some LH and some sleepiness with each dose for 40 minutes  - has noted some worries about forgetting things as he gets   - discussed   -    From 11/21/22 (RBR)  - right foot, entire foot and up the leg gets numbness and tingling   - occurs whenever he sits down to watch TV    - if he gets up and walks around, it gets better   - feels like his leg is falling asleep    - sometimes feels that it gets better with his PD meds ? Unclear because it happens at random times and not sure whether this is whenever he is due for a dose or not   - also occurs minimally in the L leg   - cant walk log distances, fatigues easily   - does not cross leg often   - follows with Dr. Chung Reagan for lumbar radiculopathy  - lower right back pain   - B12 on the lower side of normal last visit, now supplementing   - no falls   - doing PT and exercise   - uses walker at night to get up and go to the bathroom    - feels meds kick in after 30 minutes   - some dizziness after he takes a dose of cd/ld   - on bystolic, terazosin, amlodipine, losartan         From Sept 2022  - accompanied by spouse   - added rasagiline 1 mg daily   - he has noticed that this has helped   - he continues to get numbness/tingling in his R leg and R foot  -- some in the left foot as well -- this has been ongoing for a year   - worse at the end of the day  - some low back pain, had lumbar fusion many years ago  - some dizziness whenever he goes from sitting to standing, he checks his BP whenever he is seated and its normal   - does not drink enough water a day -- drinks a couple bottles   - constipation is an issue as well   - he has lost some weight ~ 7 lbs since last visit -- just saw PCP, didn't think much of it?  - also having some shortness of breath -- discussed this with PCP as well   - a little bit of loss of appetite but still cleans his plate   - drinks coffee in the morning and then doesn't eat until lunch, hasnt been eating as much protein because of cd/ld   - doing BRIOFIT -- maintenance PT therapy   - does PT 2-3 days a week   - feels better after he exercises, sleeps better as well         From June 2022 Ulisses Ponce is a L HANDED 74 y.o. male with a medical issues significant for hx of cervical fusion, HLD, HTN, lumbar stenosis with neurogenic claudication and KARRI who presents for PD eval. Previously followed by Dr. Coley, on cd/ld 25/100 2 tabs TID. Had [facility]nd opinion with [facility] in [place] in Feb 2022.  Accompanied by wife and daughter. He had previously attributed stiffness to cervical and lumbar DDD. For many years he has had stiffness. It wasn't until memory changes started that he went to the neurologist. He first saw Dr. Leone in Fall 2021. Diagnosed with Parkinson's in January 2022. They were told that he had significant retropulsion and were concerned for NPH. Harder for him to get dressed, get his shoes on. R side is stiffer. Slower speed, stride length has shortened as well. He has not noticed any improvement with the cd/ld. Takes it 6, noon and 6 pm. Makes him feel a little dizzy. No improvement in movements. No falls. Shuffles his feet. Fatigues easily. Tries to stay active, doing PT twice a week and exercises  twice weekly at his house as well. Did Big and Loud with [employer]. Has some swaying movements whenever he goes from sitting to standing.   Denies hallucinations/delusions or paranoia. Takes trazodone for sleep which helps. Some difficulty getting out of bed.     Last dose of cd/ld was at noon today. Currently supplementing with B12.     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    IMPRESSION: H&Y Stage III Idiopathic Parkinson's Disease    He presents with his wife having flown in from [place] to visit their daughter who lives in [place]. He saw Dr. Neely yesterday for second opinion on diagnosis of Parkinson Disease. He presents today with me for the Parkinson disease annual Interdisciplinary Clinic at [facility] which includes evaluation from PT, OT, ST. Please see therapy evaluations and recommendations in plan that were fully reviewed with the patient today. In compliance with AAN guidelines, today was the patient's yearly evaluation where the patient was evaluated by PT, OT, and ST, and the patient was assessed for comorbid psychiatric symptoms (completed the HADS-see media tab), cognitive impairment (with appropriate screening test of a MOCA documented below), autonomic dysfunction, sleep disturbances, and fall rate.     MOCA today was 24/30. Orthostatic blood pressures today were positive.    Last year he noticed some minor STM difficulty as well as some gait difficulty, rigidity, and difficulty with getting out of bed. They deny evidence of dream enactment behaviors but he reports loosing his sense of smell 5-10 years ago. He has  also dealt with intermittent constipation for years but now uses fiber and stool softeners. Occasionally feels lightheaded on standing but denies significant bladder dysfunction. He denies antipsychotic exposures and there is no relevant family history. No significant hallucinations, staring spells, cognitive fluctuations.  The neurologic examination is notable for parkinsonism given bilateral but slightly asymmetric (R>L) bradykinesia, rigidity and gait changes.   There were no red flag findings by history or on examination concerning for a Parkinson's plus syndrome. Given this, age of onset and gradual progression, this most likely represents idiopathic Parkinson's disease. He underwent a recent DaTscan which by report showed findings consistent with parkinsonism.  He was somewhat recently started on carbidopa-levodopa  mg IR 1 tablet, 3 times a day and notes no significant subjective benefit. Based on his examination, he would likely benefit from an increased dose of carbidopa-levodopa. He increased from 1 tab TID to 2 tabs TID and noticed a little nausea and dizziness. We discussed that this may have been too quick of an increase and recommend going to 1.5 tabs TID for a week then if tolerating ok can go to 2 tabs TID. If any SE occur, may need to increase even slower and they can let us know.     Nonmotor symptoms include mild cognitive impairment, orthostatic hypotension, and hypophonia. We discussed all of these in detail as below.  We also spent time reviewing his medications. He has an extensive list of supplements that he takes, many of which I have never heard before. We discussed that the only supplements that we recommend with Parkinson's disease are B12, folic acid, and frequently vitamin D depending on lab numbers. We will get labs today to assess these values. The rest of his supplements I recommend he review with his primary care provider or his nutritional list to see which of he can get off  of as he likely does not need all of these.      Nonmotor/Premotor ROS:  Anosmia: lost sense of smell 2-3 years  Dysarthria/Hypophonia: softening of his voice, a bit raspy  Dysphagia/Sialorrhea: some difficulty with swallowing pills, no sialorrhea   Depression: none   Cognitive slowing: short term memory issues, loses train of thought   Urinary changes: some leakage, 2/2 BPH   Constipation: chronic constipation, takes OTC supplements, eat prunes   Orthostasis: some with cd/ld   Falls: none   Freezing: none   Micrographia: normal   Sleep issues:  -RBD: none       Review of Systems:   Review of Systems   Constitutional:  Negative for chills, fever and malaise/fatigue.   HENT:  Negative for hearing loss.    Eyes:  Negative for blurred vision and double vision.   Respiratory:  Negative for cough, shortness of breath and stridor.    Cardiovascular:  Negative for chest pain and leg swelling.   Gastrointestinal:  Positive for constipation. Negative for diarrhea and nausea.   Genitourinary:  Negative for frequency and urgency.   Musculoskeletal:  Negative for falls.   Skin:  Negative for itching and rash.   Neurological:  Negative for dizziness, tremors, loss of consciousness and weakness.   Psychiatric/Behavioral:  Positive for memory loss. Negative for hallucinations.            Past Medical History: The patient  has a past medical history of Hypertension.    Social History: The patient  reports that he has never smoked. He has never used smokeless tobacco. He reports current alcohol use. He reports that he does not currently use drugs.    Family History: Their family history is not on file.    Allergies: Patient has no known allergies.     Meds:   Current Outpatient Medications on File Prior to Visit   Medication Sig Dispense Refill    apixaban (ELIQUIS) 5 mg Tab 2.5 mg 2 (two) times daily.      CALCIUM ACETATE ORAL Take 1,100 mg by mouth once daily.      carbidopa-levodopa  mg (SINEMET)  mg per tablet TAKE 2  TABLETS BY MOUTH THREE TIMES DAILY 540 tablet 3    PRALUENT PEN 75 mg/mL PnIj Inject 75 mg into the skin every 14 (fourteen) days.      terazosin (HYTRIN) 5 MG capsule Take 5 mg by mouth every evening.      traZODone (DESYREL) 100 MG tablet TAKE 1 AND 1/2 TABLETS(150 MG) BY MOUTH EVERY EVENING 135 tablet 3    [DISCONTINUED] pregabalin (LYRICA) 25 MG capsule Take 1 capsule (25 mg total) by mouth nightly for 14 days, THEN 1 capsule (25 mg total) 2 (two) times daily. 60 capsule 3    alfuzosin (UROXATRAL) 10 mg Tb24 Pt takes 10mg BID      alpha lipoic acid 300 mg Cap Take 1 capsule by mouth once daily.      amLODIPine (NORVASC) 10 MG tablet Take 10 mg by mouth. Unsure if taking 5mg or 10mg      cyanocobalamin (VITAMIN B-12) 1000 MCG tablet Take 100 mcg by mouth once daily.      docusate sodium (COLACE) 100 MG capsule Take 100 mg by mouth once daily.      folic acid (FOLVITE) 800 MCG Tab Take 800 mcg by mouth once daily.      GLUCOSAMINE HCL-VITAMIN D3 ORAL Take 1 tablet by mouth once daily.      GLYCINE ORAL Take 125 mg by mouth every evening.      ibuprofen (ADVIL,MOTRIN) 200 MG tablet Take 200 mg by mouth every 6 (six) hours as needed.      istradefylline (NOURIANZ) 20 mg Tab Take 1 tablet by mouth once daily. 30 tablet 11    ketoconazole (NIZORAL) 2 % cream Apply 1 Application topically as needed.      ketoconazole (NIZORAL) 2 % shampoo Apply 1 Application topically every other day.      Lactobacillus rhamnosus GG (CULTURELLE) 10 billion cell capsule Take 1 capsule by mouth once daily. (Patient not taking: Reported on 3/10/2025)      lutein 10 mg Tab Take 1 tablet by mouth once daily.      MAGNESIUM ORAL Take 400 mg by mouth once daily.      melatonin 5 mg Cap Take 1 capsule by mouth every evening.      phenazopyridine (PYRIDIUM) 95 MG tablet Take 95 mg by mouth once daily.      potassium gluconate 595 mg (99 mg) Tab Take 1 tablet by mouth once daily.      SODIUM SELENITE, BULK, MISC Take 1 tablet by mouth once  daily.      TAURINE ORAL Take 150 mg by mouth every evening.      vitamin D (VITAMIN D3) 1000 units Tab Take 1,000 Units by mouth.      ZEAXANTHIN, BULK, MISC 2 mg by Misc.(Non-Drug; Combo Route) route once daily.      [DISCONTINUED] rasagiline (AZILECT) 1 mg Tab TAKE 1 TABLET(1 MG) BY MOUTH EVERY DAY 90 tablet 3     No current facility-administered medications on file prior to visit.       Exam:  There were no vitals taken for this visit.    Constitutional  Well-developed, well-nourished, appears stated age   Ophthalmoscopic  No papilledema with no hemorrhages or exudates bilaterally   Cardiovascular  Radial pulses 2+ and symmetric  + erythema and edema of RLE    Neurological    * Mental status  MOCA =      - Orientation  Oriented to person, place, time, and situation     - Memory   Intact recent and remote     - Attention/concentration  Attentive, vigilant during exam     - Language  Naming & repetition intact, +2-step commands     - Fund of knowledge  Aware of current events     - Executive  Well-organized thoughts     - Other     * Cranial nerves       - CN II  PERRL, visual fields full to confrontation     - CN III, IV, VI  Extraocular movements full, normal pursuits and saccades     - CN V  Sensation V1 - V3 intact     - CN VII  Face strong and symmetric bilaterally     - CN VIII  Hearing intact bilaterally     - CN IX, X  Palate raises midline and symmetric     - CN XI  SCM and trapezius 5/5 bilaterally     - CN XII  Tongue midline   * Motor  Slightly decreased muscle bulk at bilateral calves   strength 5/5 throughout    4-/5 internally rotating foot    * Sensory   Intact to temperature and light touch, decreased vibratory sense to bilateral knees   * Coordination  No dysmetria with finger-to-nose or heel-to-shin   * Gait  See below.   * Deep tendon reflexes  1+ and symmetric throughout   Babinski downgoing bilaterally   * Specialized movement exam  mild hypophonic speech.    mild facial masking.   R > L  cogwheel rigidity.     R > L  bradykinesia.   No other dystonia, chorea, athetosis, myoclonus, or tics.   No motor impersistence.   In wheelchair today    Tremor of L hand at rest   Mild generalized dyskinesia      Laboratory/Radiological:  - Results:  No visits with results within 3 Month(s) from this visit.   Latest known visit with results is:   Lab Visit on 12/02/2024   Component Date Value Ref Range Status    Creatinine 12/02/2024 0.9  0.5 - 1.4 mg/dL Final    eGFR 12/02/2024 >60.0  >60 mL/min/1.73 m^2 Final    Vitamin B-12 12/02/2024 1368 (H)  180 - 914 ng/L Final    Iron 12/02/2024 104  45 - 160 ug/dL Final    Transferrin 12/02/2024 207  200 - 375 mg/dL Final    TIBC 12/02/2024 306  250 - 450 ug/dL Final    Saturated Iron 12/02/2024 34  20 - 50 % Final    Ferritin 12/02/2024 73  20.0 - 300.0 ng/mL Final       - Independent review of images:  DaTscan from Dec 2021      - Independent review of consultant's notes:     ASSESSMENT/PLAN:  1. Parkinson's Disease  - typical Parkinson's disease, akinetic rigid, non-motor features of alpha-synucleinopathy such as anosmia and chronic constipation   - currently taking cd/ld 2 tabs TID  nourianz 20 mg   - continue BIG and LOUD exercises at home   - monitor BP at home (both sitting and standing), stopping rasagiline now which may be worsening his orthostasis, also since we will be adding cymbalta for chronic pain -- failed gabapentin and lyrica due to side effects, chronic pain is affecting his ability to sleep. Discussed referral to pain management.       --- fell yesterday, hit his head and EMS was called. Did not go to the ED. On eliquis. Rec'd head CT. More confusion, fell twice yesterday.   Rec'd use of walker at all timnes       2. Gait instability   - multifactorial, iPD, lumbar spinal stenosis, possible sensory ataxia  - last emg 2023, discussed repeating       3. Insomnia  - continue trazodone for now  - consider belsomra   - drinking majority of liquids in the  evening, rec'd drinking during the day and limiting at night as this is causing sleep fragmentation due to urinary frequency   - no etoh       4. Constipation  - continue OTC meds   - increase water intake     5. R foot numbness/tingling   - 2/2 lumbar DDD   - decreased vibratory sense to bilateral LE   - hx of lumbar fusion         F/u with 3 months RBR       This is a patient with a serious and complex neurologic diagnosis whose overall, ongoing care is being managed and monitored by me and our Neurology clinic.   As such, since 2024,  is the appropriate add-on code to accompany the other E/M billing for this visit.      Orders Placed This Encounter    CT Head Without Contrast    DULoxetine (CYMBALTA) 20 MG capsule           Collaborating Physician, Dr. Ahuja, was available during today's encounter. Any change to plan along with cosign to appear in the EMR.       I spent 62 minutes with the patient, reviewing past encounters, labs and imaging.        Rylie Tucker PA-C   [facility]  Department of Neurology  Movement Disorders

## 2025-03-11 ENCOUNTER — PATIENT MESSAGE (OUTPATIENT)
Facility: CLINIC | Age: 75
End: 2025-03-11
Payer: MEDICARE

## 2025-04-01 ENCOUNTER — PATIENT MESSAGE (OUTPATIENT)
Facility: CLINIC | Age: 75
End: 2025-04-01
Payer: MEDICARE

## 2025-04-01 DIAGNOSIS — G47.00 INSOMNIA, UNSPECIFIED TYPE: ICD-10-CM

## 2025-04-01 DIAGNOSIS — G20.A1 PARKINSON'S DISEASE WITHOUT DYSKINESIA OR FLUCTUATING MANIFESTATIONS: Primary | ICD-10-CM

## 2025-05-01 ENCOUNTER — TELEPHONE (OUTPATIENT)
Facility: CLINIC | Age: 75
End: 2025-05-01
Payer: MEDICARE

## 2025-05-01 NOTE — TELEPHONE ENCOUNTER
"----- Message from Nettie sent at 5/1/2025  9:40 AM CDT -----  Regarding: pt adivce  Contact: [phone]  .Name Of Caller: Darin/ [facility]  Contact Preference?:[phone] What is the nature of the call?: 2nd call pt had fall 4/25/25 ,had some bleedig which stop quickly this week on 5/29/25 reported blood pressure fluctuation which resolve also on 4/30/25 pt reported confusion which is getting better but still present testing for uti but wondering if head and neck scan might be warranted pls call urgently   Additional Notes:"Thank you for all that you do for our patients"  "

## 2025-05-01 NOTE — TELEPHONE ENCOUNTER
ELMO for Darin/ [facility] Contact   :[phone]     Spoke to Darin and patient wife.   Report that pt had fall 4/25/25. He did hit his head. He had some bleeding which stopped quickly.Wife says he is doing fine now. Cognition is clearer.   Says he is being stubborn about using his walker.     Has not taken Eliquis since 4/8. Had cellulitis. Hosp stay at [facility].   Then on 4/14- was in [facility] for aspiration PNA.    Is taking ASA 81mg.        4/30/25 Family reported confusion which is getting better but still present. Tested for UTI today and it was negative.   Nurse today has neuro background and did full stroke work up. Patient was negative for s/s stroke.  Patient does c/o have some jaw pain.

## 2025-05-09 ENCOUNTER — TELEPHONE (OUTPATIENT)
Facility: CLINIC | Age: 75
End: 2025-05-09
Payer: MEDICARE

## 2025-05-09 NOTE — TELEPHONE ENCOUNTER
----- Message from Anai sent at 5/9/2025  8:31 AM CDT -----   Occupational therapist calling in regards to home health nurse being concerned  pt might have DDT in right leg , pt needs ultrasound  orders for right leg  Call back [phone] Rehana

## 2025-05-09 NOTE — TELEPHONE ENCOUNTER
Placed call to [facility]UD[facility] Kimball. Therapist assessed patient.   RLE. New swelling. Has pitting edema. No pain or SOB.   Some warmth behind the knee. Recent cellulitis  Had ultrasound a few months ago.   PT could not work with patient until cleared.   Does not want to go to ER. Therapies are on hold.     Referred to go tourgent care now to get STAT urgent ultrasound or reach out to cardio now.   Rehana Verbalized understanding.    [facility] 3rd Floor   [address]   3rd Floor   [place] [id number] [phone]

## 2025-05-12 ENCOUNTER — HOSPITAL ENCOUNTER (OUTPATIENT)
Dept: RADIOLOGY | Facility: HOSPITAL | Age: 75
Discharge: HOME | End: 2025-05-12
Attending: INTERNAL MEDICINE
Payer: MEDICARE

## 2025-05-12 DIAGNOSIS — R60.0 LOCALIZED EDEMA: ICD-10-CM

## 2025-05-12 PROCEDURE — 93971 EXTREMITY STUDY: CPT | Mod: TC,RT

## 2025-05-12 PROCEDURE — 93971 EXTREMITY STUDY: CPT | Mod: 26,RT,, | Performed by: INTERNAL MEDICINE

## 2025-05-13 ENCOUNTER — PATIENT MESSAGE (OUTPATIENT)
Facility: CLINIC | Age: 75
End: 2025-05-13
Payer: MEDICARE

## 2025-05-13 DIAGNOSIS — I87.331: Primary | ICD-10-CM

## 2025-05-16 ENCOUNTER — TELEPHONE (OUTPATIENT)
Dept: UROLOGY | Facility: CLINIC | Age: 75
End: 2025-05-16
Payer: MEDICARE

## 2025-05-16 ENCOUNTER — PATIENT MESSAGE (OUTPATIENT)
Facility: CLINIC | Age: 75
End: 2025-05-16
Payer: MEDICARE

## 2025-05-16 NOTE — TELEPHONE ENCOUNTER
Patient's caregiver requests second opinion for prostate cancer. He was originally diagnosed with Dr. Rojas at [facility] about ten years ago. He now sees Dr. Almodovar ([facility]) and Dr. Yudi Moreira ([facility]) at [facility]. He underwent 28 days of radiation yesterday. He reports Dr. Almodovar wants him to start Finasteride and Casodex, and would like a second opinion.    Of note, speech therapy came today and noted a BP of 199/90. His caregiver gave PRN Hydralazine and notified his PCP, and has a home nurse visit scheduled for today.    Referring Provider: self    Biopsy:  - 03/03/15, TRUS-Biopsy-  PATH: 2, PIN grade 3.   - 12/20/19--TRUS Biopsy #2 (significant post biopsy bleeding) PATH: No prostate cancer, just atrophy and PIN  - 12/29/23, TRUS-biopsy and MR-guided PATH: G3+3 (4mm), G3+4 (7mm), G4+3 (1mm, 2 mm) x 2. Appears 12 core matrix plus AISLINN x 2. 4/14 cores   - Grade group 3  - [id number]  - Stage IIC  - Unfavorable Intermediate Risk Prostate Cancer    PSA  01/03/12, 3.10  04/03/14, 4.90  12/15/14, 6.20  10/26/15, 4.90 (T157)  12/01/15, 3.84  12/05/16, 4.80 ()  12/04/17, 6.24 (T276)  12/04/18, 3.50 (T202)  10/29/19, 7.29  12/19/19, 24.90 (T309)  02/04/20, 6.80  05/04/20, 6.80  07/27/20, 8.50 (T317)  11/02/20, 8.20 (T298)  02/02/21, 10.30 (T333)  08/25/21, 14.30 (T256)  12/02/21, 10.80 (T249)  06/09/22, 10.00 (T265)  12/05/22, 8.70 (T246)  05/23/23, 9.64 (T214  12/06/23, 11.80   12/24-2.3    Imaging: Denies- has not had any since Yudi BONE  - 11/07/19, Prostate MRI, 60gm prostate size 4 small nodules with highest PIRad-4  - 12/19, beginsTestosterone ointment to skin   - 12/03/20, MRI prostate, 78ml PIRADs 3 in peripheral zone, 2 lesions.   - 12/02/21, MRI prostate, 56ml, PIRADs 2   - 12/05/22, MRI prostate, 66ml NO new lesions seen. Pirads-2  - 12/04/23, MRI prostate, 2 new lesions, one PIRADS 5 and one PIRADS 4.  - 12/29/23, TRUS-biopsy and MR-guided    Urology History:  Frequent noctura, but drinks throughout the  night. Has aspiration pneumonia- honey thick liquids  Undescended testicle      Pertinent Medical History:  Previous abdominal surgery? Yes- hernia repair as a child  Past Surgical History:   Procedure Laterality Date    BACK SURGERY      CARDIAC CATHETERIZATION      CARDIOVERSION      DENTAL SURGERY      HIP REPLACEMENT ARTHROPLASTY      INGUINAL HERNIA REPAIR      NECK SURGERY      ORCHIECTOMY      PROSTATE BIOPSY      w/Dr. Murphy at [facility]- date unknown    SKIN BIOPSY      [employer] O[employer]       Past Medical History:   Diagnosis Date    Aortic stenosis     Arthritis     Atrial fibrillation     Atrial flutter     Bone spur     BPH (benign prostatic hyperplasia)     Carotid artery stenosis     Cataracts, bilateral     Cervical disc disease     Diaphragmatic hernia     Difficulty swallowing     Diverticulosis     Gout, unspecified     Hard of hearing     History of colonic polyps     Hypertension     Lower GI bleed     Mixed hyperlipidemia     Paroxysmal atrial fibrillation     Presence of Watchman left atrial appendage closure device     Prostate cancer     Skin cancer     Subclavian steal syndrome     Unspecified osteoarthritis, unspecified site      Recently broke sacrum    Family History:   Brother- brain cancer    ----- Message from eBillme sent at 5/16/2025 11:35 AM CDT -----  Regarding: Scheduling Request  Contact: [phone] via portal  Scheduling Request   Appt Type:  np Date/Time Preference:next available Caller Name:Shelby-caregiver Contact Preference:[phone] via portal Comment: 2nd opinion for prostate cancer. Nothing available in epic. Please call to advise thank you

## 2025-05-22 ENCOUNTER — NURSE TRIAGE (OUTPATIENT)
Dept: ADMINISTRATIVE | Facility: CLINIC | Age: 75
End: 2025-05-22
Payer: MEDICARE

## 2025-05-22 NOTE — TELEPHONE ENCOUNTER
HH nurse reporting /98 and wants to make a note in the chart for the provider. Pt is stable, but it is outside of parameters and is required to report it. Advised caller that pts pcp does not appear to be an [facility] provider but can route to a pool listed with provider's name. She VU. No further assistance needed.  Reason for Disposition   Health information question, no triage required and triager able to answer question    Protocols used: Information Only Call - No Triage-A-

## 2025-05-27 ENCOUNTER — OFFICE VISIT (OUTPATIENT)
Dept: SLEEP MEDICINE | Facility: CLINIC | Age: 75
End: 2025-05-27
Payer: MEDICARE

## 2025-05-27 DIAGNOSIS — G20.A1 PARKINSON'S DISEASE WITHOUT DYSKINESIA OR FLUCTUATING MANIFESTATIONS: ICD-10-CM

## 2025-05-27 DIAGNOSIS — G47.00 INSOMNIA, UNSPECIFIED TYPE: ICD-10-CM

## 2025-05-27 DIAGNOSIS — G47.30 SLEEP APNEA, UNSPECIFIED TYPE: Primary | ICD-10-CM

## 2025-05-27 PROCEDURE — 98006 SYNCH AUDIO-VIDEO EST MOD 30: CPT | Mod: 95,,,

## 2025-05-27 NOTE — PROGRESS NOTES
The patient location is: [place]  The chief complaint leading to consultation is: No chief complaint on file.        Visit type: audiovisual    Face to Face time with patient: 20  35 minutes of total time spent on the encounter, which includes face to face time and non-face to face time preparing to see the patient (eg, review of tests), Obtaining and/or reviewing separately obtained history, Documenting clinical information in the electronic or other health record, Independently interpreting results (not separately reported) and communicating results to the patient/family/caregiver, or Care coordination (not separately reported).         Each patient to whom he or she provides medical services by telemedicine is:  (1) informed of the relationship between the physician and patient and the respective role of any other health care provider with respect to management of the patient; and (2) notified that he or she may decline to receive medical services by telemedicine and may withdraw from such care at any time.    Notes:     CC: No chief complaint on file.     Referred by No ref. provider found for a sleep evaluation.     HPI     Subjective    HPI:  Sleep Apnea:  Symptoms:    [x] Loud snoring[] Witnessed apneas [] Gasping [] Choking[x] Interrupted Sleep [x]  Nocturia [x]  Non refreshing sleep [x] Excessive daytime sleepiness   []  Morning headaches [] Nasal Congestion [x] Dry Mouth [] Excessive Daytime Fatigue [] None  Duration:   [] Days  [] Months  [x] Years  Comments: patient here with caregiver and wife who are providing history for the patient, reports dx with KARRI years ago, stopped using cpap therapy about 4 years ago after weight loss of 30lbs. Stopped snoring without machine and was doing fine. Returned today with reports of snoring, interrupted sleep, non refreshing sleep, excessive daytime sleepiness and dry mouth. Patient dozes off or naps frequently throughout the day at least 2. PMH significant for PD,  CAD, HTN.     Wake up Feeling: [] Refreshed  [x] Non refreshed [] Occasionally Tired  Do You Take Naps: [x] Yes [] No Number of Naps: 2 or more     Insomnia:  Symptoms:    [] Difficulty falling asleep [x] Difficulty Staying Asleep[x] Frequent nocturnal awakenings[] Early morning awakenings  [] Denies problems with sleep  Duration:     [] Days  [] Months  [x] Years  Frequency:  [] Occasionally [] Times per week [x] Daily [] Weekly [] Rarely  Comments: reports difficulty staying asleep with frequent nocturnal awakenings. Contributing factors include poor sleep hygiene: naps during the day, spends excessive time in bed, stimulating activity when can't sleep (tv on while asleep). Current treatments include trazodone 100 mg and melatonin 6 mg at bedtime. Improves sleep latency and sleep consolidation somewhat. Still getting up to use the bathroom 4-5 times per night.           5/27/2025    10:51 AM   EPWORTH SLEEPINESS SCALE TOTAL SCORE    Total score 13        Patient-reported     (Validated Sleepiness Questionnaire with higher score indicating greater sleepiness (0-24)    STOP BANG Questionnaire  Patient diagnosed with Obstructive Sleep Apnea?: No  Has loud snoring: Yes  Disturbed sleep, daytime fatigue, daytime somnolence: Yes  Observed to have interrupted breathing during sleep: No  Takes medication for high blood pressure: Yes  Not taking BP medication but supposed to be: No  Recent BMI (Calculated): 25.1  Is BMI greater than 35 kg/m2?: 0=No  Age older than 50 years old?: 1=Yes  Has large neck size >40cm (15.7in., large male shirt size, large male collar size >16): No  Gender - Male: 1=Yes  STOP-Bang Total Score: 5  (Validated Stop Bang Questionnaire with higher score indicating greater risk of KARRI (0-2, 3-4, 5-8)) Risk: High        5/22/2025     4:52 PM   Sleep Clinic New Patient   What time do you go to bed on a week day? (Give a range) 9 pm   What time do you go to bed on a day off? (Give a range) 9 pm   How long  does it take you to fall asleep? (Give a range) 2 minutes   On average, how many times per night do you wake up? 4   How long does it take you to fall back into sleep? (Give a range) 15 -30 minutes   What time do you wake up to start your day on a week day? (Give a range) 6 am   What time do you wake up to start your day on a day off? (Give a range) 6 am   What time do you get out of bed? (Give a range) 6:30   On average, how many hours do you sleep? 4 or 5   On average, how many naps do you take per day? 3   Rate your sleep quality from 0 to 5 (0-poor, 5-great). 2   Have you experienced:  Weight loss?   How much weight have you lost or gained (in lbs.) in the last year? 5#   On average, how many times per night do you go to the bathroom?  4-5   Have you ever had a sleep study/CPAP machine/surgery for sleep apnea? Yes   Have you ever had a CPAP machine for sleep apnea? Yes   Have you ever had surgery for sleep apnea? No       Current Sleep Medication(s): Melatonin 6 mg nightly  Trazodone 100 mg nighlty    Previous Sleep Medication(s): Seroquel    Lorazepam    Sleep Factors:  Sleep Environment: Cool, Dark, Comfortable, and TV on while asleep    Bed Partner: Caregiver there to check on the patient at night  Sleep Problems: naps during the day  chronic pain  does stimulating activities when can't sleep        Objective    Records Reviewed:   Lab Results   Component Value Date    CO2 25 04/22/2025    FERRITIN 73 12/02/2024      No echocardiogram results found for the past 12 months  Sleep Studies : None    Objective:  Vitals: There were no vitals taken for this visit.   Exam:  Gen: Well Appearing, demonstrates insight  Skin: No rash or lesions on bridge of nose or mouth          Assessment & Plan  Sleep apnea, unspecified type  Diagnostic Testing: Home Sleep Apnea Test (HST) is indicated due to comorbid conditions: CAD, Uncontrolled HTN, and Parkinsonism with symptoms: snoring, interrupted sleep, nocturia, restless  sleep, non refreshing sleep, excessive daytime sleepiness, ESS: 13/24, excessive daytime fatigue, cognitive dysfunction, and difficulty with focusing  Education & Treatment Options:  Discussed the etiology and consequences of untreated KARRI, including risks of cardiovascular disease, hypertension, stroke, metabolic dysfunction, and excessive daytime sleepiness.  Reviewed treatment options:  Positive Airway Pressure (PAP) therapy, Weight loss Positional therapy, Oral appliance therapy  Lifestyle Modifications: Advised weight management, alcohol reduction, and optimizing sleep hygiene.  Safety Precautions: Recommended avoiding driving if experiencing excessive daytime sleepiness.  Next Steps:  If KARRI is confirmed, patient agrees to trial APAP (Will discuss results and treatment options with patient before ordering new machine.   If PAP therapy is initiated, follow-up within 31-90 days to assess compliance and effectiveness.  Results will be communicated by Mychart   Orders:    Home Sleep Study; Future    Insomnia, unspecified type  Discussed sleep hygiene measures including regular sleep schedule, optimal sleep environment, and relaxing presleep rituals.  Avoid daytime naps.  Recommended daily exercise.  Educational materials distributed.  Continue taking medication(s): Melatonin 6 mg nightly  Trazodone 100 mg nightly

## 2025-05-27 NOTE — PATIENT INSTRUCTIONS
SLEEP LAB ([facility] or [facility]) will contact you to schedule the sleep study. Their number is [phone] (ext [phone]). Please call them if you do not hear from them in 2 weeks from now.  The [facility] is located on 7th floor of the [facility]; [facility] is located in [facility] ( 3rd floor [facility]).    SLEEP CLINIC (my assistant) will call you when the sleep study results are ready or I will message you through the portal with the results as we have discussed - if you have not heard from us by 2 weeks from the date of the study, or you can use My [facility] to contact me.    Our clinic phone number is [phone] (ext 1)       You are advised to abstain from driving should you feel sleepy or drowsy.     Sleep Hygiene Tips for Insomnia  If you're struggling with falling asleep or staying asleep, the following habits can help retrain your brain and body for better rest. These tips are especially important for managing insomnia:    1. Wake up at the same time every day  Even if you didnt sleep well the night before, set your alarm and get up at the same time every morning. This strengthens your bodys internal sleep rhythm.    2. Go to bed only when sleepy  Dont force yourself to sleep. If youre not sleepy, do a quiet activity outside of bed (like reading) until you feel drowsy, then try again.    3. Get out of bed if you cant sleep  If youre awake in bed for more than 20 minutes, get up and do a calming, non-stimulating activity until you feel sleepy. Avoid screens during this time.    4. Reserve your bed for sleep only  Dont read, watch TV, eat, or use your phone in bed. This helps your brain link your bed with sleep--not with staying awake.    5. Limit naps during the day  If you must nap, keep it under 30 minutes and avoid napping after 2 PM. Long or late naps can make it harder to fall asleep at night.    6. Reduce screen time before bed  Blue light from phones,  tablets, and TVs can interfere with melatonin, your bodys natural sleep hormone. Aim to turn off screens 60 minutes before bed.    7. Create a calming bedtime routine  Start winding down at least 30 minutes before sleep. Activities like gentle stretching, reading, or listening to calm music can help your body transition to sleep.    8. Avoid caffeine after noon  Caffeine (found in coffee, tea, soda, and chocolate) can stay in your system for hours and worsen insomnia.    9. Avoid alcohol close to bedtime  Alcohol may help you fall asleep initially but disrupts deep sleep and causes nighttime awakenings.    10. Keep your bedroom cool, dark, and quiet  Use blackout curtains, white noise machines, or fans as needed. A comfortable sleep environment can reduce nighttime awakenings.    If insomnia symptoms continue despite these efforts, we can explore additional treatment options, such as Cognitive Behavioral Therapy for Insomnia (CBT-I) or medication if appropriate.     Additional Resources:  Sleep Foundation-- CBT-I Overview  https://www.sleepfoundation.org/insomnia/treatment/cognitive-behavioral-therapy-insomnia     CBT--I  Laila (Free)  https://[employer]va.gov/laila/cbt-i-

## 2025-05-28 ENCOUNTER — TELEPHONE (OUTPATIENT)
Dept: SLEEP MEDICINE | Facility: OTHER | Age: 75
End: 2025-05-28
Payer: MEDICARE

## 2025-06-11 NOTE — PROGRESS NOTES
Name: Ulisses Ponce  MRN: [id number]   CSN: [id number]      Date: 06/12/2025    Referring physician:  No referring provider defined for this encounter.    Chief Complaint: PD     Interval History:  - here with friend and spouse   - had two hospitalizations, one for cellulitis and aspiration pneumonia   - he was given ativan   - within last couple of weeks, numbness in his thigh   - sometimes whenever he gets up and moves after sitting for a long period of time, it feels better   - takes hydralazine prn   - cd/ld 2 tabs TID 7 - 1230 and 7   - not sleeping well, dry mouth and then drinks water  - seeing sleep medicine, planning for sleep study   - eating well   - failed seroquel for sleep, trazodone 100 mg qhs   - shoulder pain, saw ortho --   - drinking thickened liquids   - more paranoia, some rumination   - some phantom hallucinations   - more punding       March 2025  - two falls last night, hit his head   - here with friend and spouse   - neuropathy continues to be an issue, can't feel his feet   - right leg turns red, swollen  -- had ultrasound about a month ago   - more confusion, feeling lightheaded -- whenever he goes from sitting to standing   - wakes up from a nap and doesn't know if it is day or night   - he was not using the walker with either falls    - sleeps in recliner, has some breakdown of skin on his sacrum   - seeing PCP tomorrow   - last emg was 2023   - feels like numbness and tingling has worsened in his legs  - doing exercises 5 days a week   - taking alpha lipoic acid daily, not taking lyrica -- too many side effects, same with gabapentin   - tremors a bit worse in his left hand.   - still taking rasagiline, trazodone   - takes nourianz   - cd/ld 2 tabs TID -- 7 -- 1130 and 5 30   - nourianz and rasagiline in the morning   - not sleeping well, takes trazodone 100 mg qhs   - still supplementing with b12    - weak urinary stream         Dec 2024  - had watchman for afib   - 2 tabs TID --  6-12-6, doesn't always take it on time   - rasagiline and nourianz 20 mg   - here with spouse and friend (OT) who is going to do exercises with him at home   - pain in his legs whenever he is sitting, moves and it goes away  - purposely needs to move legs   - gabapentin caused memory loss    - does not recall taking lyrica   - supplementing with b12   - taking otc supplement for constipation which is helpful   - mostly has 1-2 BM daily   - rocks back and forth whenever he goes from sitting to standing   - loses train of thought when talking, sometimes comes back to him   - on O2 at night, saw pulmonology/lifestyle physician who rec'd O2 at night  - feels like he sleeps better whenever he uses it   - going to [place]         April 2024  - last seen virtually 2 months ago  - letgs are worse, but still taking meds 2 TID  - MRI was ordered but not done, will do now (likely even worse now)  - more fatigue, attributes some to month of xrt therapy for prostate (but now done)  - using gabapentin and lidocaine cream  - more shoujlder pain        From 2022:  - getting exosomes and NAD via Dr. Jackson  - wife thinks his focus is btter, maybe his gait is getting better  - he admits more stability  - walking for exercise   - no falls   - accompanied by spouse         From Feb 2023  - 1st visit with me  - dx with PD, DatSCAN+, hx of RLS  - on cd/ld 25/100 2 TID and rasagiline  - has side effects of some LH and some sleepiness with each dose for 40 minutes  - has noted some worries about forgetting things as he gets   - discussed   -    From 11/21/22 (RBR)  - right foot, entire foot and up the leg gets numbness and tingling   - occurs whenever he sits down to watch TV    - if he gets up and walks around, it gets better   - feels like his leg is falling asleep    - sometimes feels that it gets better with his PD meds ? Unclear because it happens at random times and not sure whether this is whenever he is due for a dose or not    - also occurs minimally in the L leg   - cant walk log distances, fatigues easily   - does not cross leg often   - follows with Dr. Chung Reagan for lumbar radiculopathy  - lower right back pain   - B12 on the lower side of normal last visit, now supplementing   - no falls   - doing PT and exercise   - uses walker at night to get up and go to the bathroom    - feels meds kick in after 30 minutes   - some dizziness after he takes a dose of cd/ld   - on bystolic, terazosin, amlodipine, losartan         From Sept 2022  - accompanied by spouse   - added rasagiline 1 mg daily   - he has noticed that this has helped   - he continues to get numbness/tingling in his R leg and R foot -- some in the left foot as well -- this has been ongoing for a year   - worse at the end of the day  - some low back pain, had lumbar fusion many years ago  - some dizziness whenever he goes from sitting to standing, he checks his BP whenever he is seated and its normal   - does not drink enough water a day -- drinks a couple bottles   - constipation is an issue as well   - he has lost some weight ~ 7 lbs since last visit -- just saw PCP, didn't think much of it?  - also having some shortness of breath -- discussed this with PCP as well   - a little bit of loss of appetite but still cleans his plate   - drinks coffee in the morning and then doesn't eat until lunch, hasnt been eating as much protein because of cd/ld   - doing BRIOFIT -- maintenance PT therapy   - does PT 2-3 days a week   - feels better after he exercises, sleeps better as well         From June 2022 Ulisses Ponce is a L HANDED 74 y.o. male with a medical issues significant for hx of cervical fusion, HLD, HTN, lumbar stenosis with neurogenic claudication and KARRI who presents for PD eval. Previously followed by Dr. Coley, on cd/ld 25/100 2 tabs TID. Had [facility]nd opinion with [facility] [place] in Feb 2022.  Accompanied by wife and daughter. He had  previously attributed stiffness to cervical and lumbar DDD. For many years he has had stiffness. It wasn't until memory changes started that he went to the neurologist. He first saw Dr. Leone in Fall 2021. Diagnosed with Parkinson's in January 2022. They were told that he had significant retropulsion and were concerned for NPH. Harder for him to get dressed, get his shoes on. R side is stiffer. Slower speed, stride length has shortened as well. He has not noticed any improvement with the cd/ld. Takes it 6, noon and 6 pm. Makes him feel a little dizzy. No improvement in movements. No falls. Shuffles his feet. Fatigues easily. Tries to stay active, doing PT twice a week and exercises twice weekly at his house as well. Did Big and Loud with [employer]. Has some swaying movements whenever he goes from sitting to standing.   Denies hallucinations/delusions or paranoia. Takes trazodone for sleep which helps. Some difficulty getting out of bed.     Last dose of cd/ld was at noon today. Currently supplementing with B12.     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    IMPRESSION: H&Y Stage III Idiopathic Parkinson's Disease    He presents with his wife having flown in from [place] to visit their daughter who lives in [place]. He saw Dr. Neely yesterday for second opinion on diagnosis of Parkinson Disease. He presents today with me for the Parkinson disease annual Interdisciplinary Clinic at [facility] which includes evaluation from PT, OT, ST. Please see therapy evaluations and recommendations in plan that were fully reviewed with the patient today.  In compliance with AAN guidelines, today was the patient's yearly evaluation where the patient was evaluated by PT, OT, and ST, and the patient was assessed for comorbid psychiatric symptoms (completed the HADS-see media tab), cognitive impairment (with appropriate screening test of a MOCA documented below), autonomic dysfunction, sleep disturbances, and fall rate.     MOCA today was 24/30. Orthostatic blood pressures today were positive.    Last year he noticed some minor STM difficulty as well as some gait difficulty, rigidity, and difficulty with getting out of bed. They deny evidence of dream enactment behaviors but he reports loosing his sense of smell 5-10 years ago. He has also dealt with intermittent constipation for years but now uses fiber and stool softeners. Occasionally feels lightheaded on standing but denies significant bladder dysfunction. He denies antipsychotic exposures and there is no relevant family history. No significant hallucinations, staring spells, cognitive fluctuations.  The neurologic examination is notable for parkinsonism given bilateral but slightly asymmetric (R>L) bradykinesia, rigidity and gait changes.   There were no red flag findings by history or on examination concerning for a Parkinson's plus syndrome. Given this, age of onset and gradual progression, this most likely represents idiopathic Parkinson's disease. He underwent a recent DaTscan which by report showed findings consistent with parkinsonism.  He was somewhat recently started on carbidopa-levodopa  mg IR 1 tablet, 3 times a day and notes no significant subjective benefit. Based on his examination, he would likely benefit from an increased dose of carbidopa-levodopa. He increased from 1 tab TID to 2 tabs TID and noticed a little nausea and dizziness. We discussed that this may have been too quick of an increase and recommend going to 1.5 tabs TID for a week then if tolerating ok can go to 2 tabs TID. If any SE  occur, may need to increase even slower and they can let us know.     Nonmotor symptoms include mild cognitive impairment, orthostatic hypotension, and hypophonia. We discussed all of these in detail as below.  We also spent time reviewing his medications. He has an extensive list of supplements that he takes, many of which I have never heard before. We discussed that the only supplements that we recommend with Parkinson's disease are B12, folic acid, and frequently vitamin D depending on lab numbers. We will get labs today to assess these values. The rest of his supplements I recommend he review with his primary care provider or his nutritional list to see which of he can get off of as he likely does not need all of these.      Nonmotor/Premotor ROS:  Anosmia: lost sense of smell 2-3 years  Dysarthria/Hypophonia: softening of his voice, a bit raspy  Dysphagia/Sialorrhea: some difficulty with swallowing pills, no sialorrhea   Depression: none   Cognitive slowing: short term memory issues, loses train of thought   Urinary changes: some leakage, 2/2 BPH   Constipation: chronic constipation, takes OTC supplements, eat prunes   Orthostasis: some with cd/ld   Falls: none   Freezing: none   Micrographia: normal   Sleep issues:  -RBD: none       Review of Systems:   Review of Systems   Constitutional:  Negative for chills, fever and malaise/fatigue.   HENT:  Negative for hearing loss.    Eyes:  Negative for blurred vision and double vision.   Respiratory:  Negative for cough, shortness of breath and stridor.    Cardiovascular:  Negative for chest pain and leg swelling.   Gastrointestinal:  Positive for constipation. Negative for diarrhea and nausea.   Genitourinary:  Negative for frequency and urgency.   Musculoskeletal:  Negative for falls.   Skin:  Negative for itching and rash.   Neurological:  Negative for dizziness, tremors, loss of consciousness and weakness.   Psychiatric/Behavioral:  Positive for memory loss.  Negative for hallucinations.            Past Medical History: The patient  has a past medical history of Aortic stenosis, Arthritis, Atrial fibrillation, Atrial flutter, Bone spur, BPH (benign prostatic hyperplasia), Carotid artery stenosis, Cataracts, bilateral, Cervical disc disease, Diaphragmatic hernia, Difficulty swallowing, Diverticulosis, Gout, unspecified, Hard of hearing, History of colonic polyps, Hypertension, Lower GI bleed, Mixed hyperlipidemia, Paroxysmal atrial fibrillation, Presence of Watchman left atrial appendage closure device, Prostate cancer, Skin cancer, Subclavian steal syndrome, and Unspecified osteoarthritis, unspecified site.    Social History: The patient  reports that he has never smoked. He has never used smokeless tobacco. He reports current alcohol use. He reports that he does not currently use drugs.    Family History: Their family history includes Cancer in his brother; Heart disease in his brother, father, and mother; Heart failure in his mother.    Allergies: Gabapentin     Meds:   Current Outpatient Medications on File Prior to Visit   Medication Sig Dispense Refill    alpha lipoic acid 300 mg Cap Take 1 capsule by mouth once daily.      carbidopa-levodopa  mg (SINEMET)  mg per tablet TAKE 2 TABLETS BY MOUTH THREE TIMES DAILY 540 tablet 3    cyanocobalamin (VITAMIN B-12) 1000 MCG tablet Take 100 mcg by mouth once daily.      docusate sodium (COLACE) 100 MG capsule Take 100 mg by mouth once daily.      folic acid (FOLVITE) 800 MCG Tab Take 800 mcg by mouth once daily.      GLYCINE ORAL Take 125 mg by mouth every evening.      istradefylline (NOURIANZ) 20 mg Tab Take 1 tablet by mouth once daily. 30 tablet 11    Lactobacillus rhamnosus GG (CULTURELLE) 10 billion cell capsule Take 1 capsule by mouth once daily. (Patient not taking: Reported on 3/10/2025)      lutein 10 mg Tab Take 1 tablet by mouth once daily.      MAGNESIUM ORAL Take 400 mg by mouth once daily.       "melatonin 5 mg Cap Take 1 capsule by mouth every evening.      PRALUENT PEN 75 mg/mL PnIj Inject 75 mg into the skin every 14 (fourteen) days.      SODIUM SELENITE, BULK, MISC Take 1 tablet by mouth once daily.      TAURINE ORAL Take 150 mg by mouth every evening.      telmisartan (MICARDIS) 80 MG Tab Take 80 mg by mouth.      traZODone (DESYREL) 100 MG tablet TAKE 1 AND 1/2 TABLETS(150 MG) BY MOUTH EVERY EVENING 135 tablet 3    vitamin D (VITAMIN D3) 1000 units Tab Take 1,000 Units by mouth.      ZEAXANTHIN, BULK, MISC 2 mg by Misc.(Non-Drug; Combo Route) route once daily.      [DISCONTINUED] alfuzosin (UROXATRAL) 10 mg Tb24 Pt takes 10mg BID      [DISCONTINUED] amLODIPine (NORVASC) 10 MG tablet Take 10 mg by mouth. Unsure if taking 5mg or 10mg      [DISCONTINUED] CALCIUM ACETATE ORAL Take 1,100 mg by mouth once daily.      [DISCONTINUED] DULoxetine (CYMBALTA) 20 MG capsule Take 1 capsule (20 mg total) by mouth once daily. 30 capsule 11    [DISCONTINUED] GLUCOSAMINE HCL-VITAMIN D3 ORAL Take 1 tablet by mouth once daily.      [DISCONTINUED] ibuprofen (ADVIL,MOTRIN) 200 MG tablet Take 200 mg by mouth every 6 (six) hours as needed.      [DISCONTINUED] ketoconazole (NIZORAL) 2 % cream Apply 1 Application topically as needed.      [DISCONTINUED] ketoconazole (NIZORAL) 2 % shampoo Apply 1 Application topically every other day.      [DISCONTINUED] phenazopyridine (PYRIDIUM) 95 MG tablet Take 95 mg by mouth once daily.      [DISCONTINUED] potassium gluconate 595 mg (99 mg) Tab Take 1 tablet by mouth once daily.      [DISCONTINUED] terazosin (HYTRIN) 5 MG capsule Take 5 mg by mouth every evening.       No current facility-administered medications on file prior to visit.       Exam:  /72   Pulse (!) 58   Ht 5' 9" (1.753 m)   Wt 77.1 kg (170 lb) Comment: per pt  BMI 25.10 kg/m²     Constitutional  Well-developed, well-nourished, appears stated age   Ophthalmoscopic  No papilledema with no hemorrhages or exudates " bilaterally   Cardiovascular  Radial pulses 2+ and symmetric  + erythema and edema of RLE    Neurological    * Mental status  MOCA =      - Orientation  Oriented to person, place, time, and situation     - Memory   Intact recent and remote     - Attention/concentration  Attentive, vigilant during exam     - Language  Naming & repetition intact, +2-step commands     - Fund of knowledge  Aware of current events     - Executive  Well-organized thoughts     - Other     * Cranial nerves       - CN II  PERRL, visual fields full to confrontation     - CN III, IV, VI  Extraocular movements full, normal pursuits and saccades     - CN V  Sensation V1 - V3 intact     - CN VII  Face strong and symmetric bilaterally     - CN VIII  Hearing intact bilaterally     - CN IX, X  Palate raises midline and symmetric     - CN XI  SCM and trapezius 5/5 bilaterally     - CN XII  Tongue midline   * Motor  Slightly decreased muscle bulk at bilateral calves   strength 5/5 throughout    4-/5 internally rotating foot    * Sensory   Intact to temperature and light touch, decreased vibratory sense to bilateral knees   * Coordination  No dysmetria with finger-to-nose or heel-to-shin   * Gait  See below.   * Deep tendon reflexes  1+ and symmetric throughout   Babinski downgoing bilaterally   * Specialized movement exam  mild hypophonic speech.    mild facial masking.   R > L cogwheel rigidity.     R > L  bradykinesia.   No other dystonia, chorea, athetosis, myoclonus, or tics.   No motor impersistence.   In wheelchair today    Tremor of L hand at rest   Mild generalized dyskinesia      Laboratory/Radiological:  - Results:  No visits with results within 3 Month(s) from this visit.   Latest known visit with results is:   Lab Visit on 12/02/2024   Component Date Value Ref Range Status    Creatinine 12/02/2024 0.9  0.5 - 1.4 mg/dL Final    eGFR 12/02/2024 >60.0  >60 mL/min/1.73 m^2 Final    Vitamin B-12 12/02/2024 1368 (H)  180 - 914 ng/L Final    Iron  12/02/2024 104  45 - 160 ug/dL Final    Transferrin 12/02/2024 207  200 - 375 mg/dL Final    TIBC 12/02/2024 306  250 - 450 ug/dL Final    Saturated Iron 12/02/2024 34  20 - 50 % Final    Ferritin 12/02/2024 73  20.0 - 300.0 ng/mL Final       - Independent review of images:  DaTscan from Dec 2021      - Independent review of consultant's notes:     ASSESSMENT/PLAN:  1. Parkinson's Disease  - typical Parkinson's disease, akinetic rigid, non-motor features of alpha-synucleinopathy such as anosmia and chronic constipation   - currently taking cd/ld 2 tabs TID  nourianz 20 mg   - continue BIG and LOUD exercises at home   - perseverative thoughts, punding -- some paranoia as well. Rec'd additional of donepezil       2. Gait instability   - multifactorial, iPD, lumbar spinal stenosis, possible sensory ataxia  - last emg 2023, rec'd repeating       3. Insomnia  - continue trazodone for now -- scheduled for sleep study and then follow up with sleep   - consider belsomra   - drinking majority of liquids in the evening, rec'd drinking during the day and limiting at night as this is causing sleep fragmentation due to urinary frequency   - no etoh   - does significantly better the days after he has slept well       4. Constipation  - continue OTC meds   - increase water intake     5. R foot numbness/tingling   - 2/2 lumbar DDD   - decreased vibratory sense to bilateral LE   - hx of lumbar fusion   - repeat EMG -- now affecting bilateral LE -- feels that this is progressively ascending   - magna life cream         F/u with 3 months RBR       This is a patient with a serious and complex neurologic diagnosis whose overall, ongoing care is being managed and monitored by me and our Neurology clinic.   As such, since 2024,  is the appropriate add-on code to accompany the other E/M billing for this visit.      Orders Placed This Encounter    EMG W/ ULTRASOUND AND NERVE CONDUCTION TEST 2 Extremities    donepeziL (ARICEPT) 10 MG  tablet       Collaborating Physician, Dr. Ahuja, was available during today's encounter. Any change to plan along with cosign to appear in the EMR.       I spent 62 minutes with the patient, reviewing past encounters, labs and imaging.        Rylie Tucker PA-C   [facility]  Department of Neurology  Movement Disorders

## 2025-06-12 ENCOUNTER — OFFICE VISIT (OUTPATIENT)
Facility: CLINIC | Age: 75
End: 2025-06-12
Payer: MEDICARE

## 2025-06-12 ENCOUNTER — TELEPHONE (OUTPATIENT)
Facility: CLINIC | Age: 75
End: 2025-06-12
Payer: MEDICARE

## 2025-06-12 VITALS
BODY MASS INDEX: 25.18 KG/M2 | HEART RATE: 58 BPM | DIASTOLIC BLOOD PRESSURE: 72 MMHG | WEIGHT: 170 LBS | HEIGHT: 69 IN | SYSTOLIC BLOOD PRESSURE: 125 MMHG

## 2025-06-12 DIAGNOSIS — G20.A1 PARKINSON'S DISEASE WITHOUT DYSKINESIA OR FLUCTUATING MANIFESTATIONS: Primary | ICD-10-CM

## 2025-06-12 DIAGNOSIS — G60.3 IDIOPATHIC PROGRESSIVE NEUROPATHY: ICD-10-CM

## 2025-06-12 DIAGNOSIS — R26.81 GAIT INSTABILITY: ICD-10-CM

## 2025-06-12 DIAGNOSIS — R53.1 WEAKNESS: ICD-10-CM

## 2025-06-12 DIAGNOSIS — G47.00 INSOMNIA, UNSPECIFIED TYPE: ICD-10-CM

## 2025-06-12 DIAGNOSIS — M48.062 LUMBAR STENOSIS WITH NEUROGENIC CLAUDICATION: ICD-10-CM

## 2025-06-12 DIAGNOSIS — M54.16 LUMBAR RADICULOPATHY: ICD-10-CM

## 2025-06-12 DIAGNOSIS — G62.9 PERIPHERAL POLYNEUROPATHY: ICD-10-CM

## 2025-06-12 PROCEDURE — 99214 OFFICE O/P EST MOD 30 MIN: CPT | Mod: PBBFAC | Performed by: PHYSICIAN ASSISTANT

## 2025-06-12 PROCEDURE — 99215 OFFICE O/P EST HI 40 MIN: CPT | Mod: S$PBB,,, | Performed by: PHYSICIAN ASSISTANT

## 2025-06-12 PROCEDURE — G2211 COMPLEX E/M VISIT ADD ON: HCPCS | Mod: ,,, | Performed by: PHYSICIAN ASSISTANT

## 2025-06-12 PROCEDURE — 99999 PR PBB SHADOW E&M-EST. PATIENT-LVL IV: CPT | Mod: PBBFAC,,, | Performed by: PHYSICIAN ASSISTANT

## 2025-06-12 RX ORDER — DONEPEZIL HYDROCHLORIDE 10 MG/1
TABLET, FILM COATED ORAL
Qty: 30 TABLET | Refills: 11 | Status: SHIPPED | OUTPATIENT
Start: 2025-06-12

## 2025-06-12 RX ORDER — TELMISARTAN 80 MG/1
80 TABLET ORAL
COMMUNITY

## 2025-06-16 ENCOUNTER — TELEPHONE (OUTPATIENT)
Dept: SLEEP MEDICINE | Facility: OTHER | Age: 75
End: 2025-06-16
Payer: MEDICARE

## 2025-06-17 ENCOUNTER — TELEPHONE (OUTPATIENT)
Dept: SLEEP MEDICINE | Facility: OTHER | Age: 75
End: 2025-06-17
Payer: MEDICARE

## 2025-06-18 ENCOUNTER — PATIENT MESSAGE (OUTPATIENT)
Facility: CLINIC | Age: 75
End: 2025-06-18
Payer: MEDICARE

## 2025-06-18 ENCOUNTER — HOSPITAL ENCOUNTER (OUTPATIENT)
Dept: SLEEP MEDICINE | Facility: OTHER | Age: 75
Discharge: HOME | End: 2025-06-18
Payer: MEDICARE

## 2025-06-18 DIAGNOSIS — G47.30 SLEEP APNEA, UNSPECIFIED TYPE: ICD-10-CM

## 2025-06-18 DIAGNOSIS — G47.33 OSA (OBSTRUCTIVE SLEEP APNEA): Primary | ICD-10-CM

## 2025-06-18 PROCEDURE — 95800 SLP STDY UNATTENDED: CPT | Mod: 52

## 2025-06-19 ENCOUNTER — PATIENT MESSAGE (OUTPATIENT)
Facility: CLINIC | Age: 75
End: 2025-06-19
Payer: MEDICARE

## 2025-06-20 ENCOUNTER — PATIENT MESSAGE (OUTPATIENT)
Dept: SLEEP MEDICINE | Facility: CLINIC | Age: 75
End: 2025-06-20

## 2025-06-20 PROCEDURE — 95800 SLP STDY UNATTENDED: CPT | Mod: 26,52,, | Performed by: PSYCHIATRY & NEUROLOGY

## 2025-06-24 ENCOUNTER — RESULTS FOLLOW-UP (OUTPATIENT)
Dept: SLEEP MEDICINE | Facility: CLINIC | Age: 75
End: 2025-06-24
Payer: MEDICARE

## 2025-06-24 DIAGNOSIS — G47.33 OBSTRUCTIVE SLEEP APNEA: Primary | ICD-10-CM

## 2025-06-24 PROBLEM — G47.30 SLEEP APNEA: Status: ACTIVE | Noted: 2021-03-26

## 2025-06-24 NOTE — PROCEDURES
Sleep Study Report  Patient Name Ulisses Ponce Study Ordered By Nacho Gillespie  Date of Night 1 06/18/2025 10:13:06 PM Date of Birth 1950  Identification Number [id number]  Overall AHI (4%)* % time < 90% Sp02 Mean Sp02 % time snoring > 30dB  33 4% 97.3% 0%  PHYSICIAN INTERPRETATION AND COMMENTS: Findings are consistent with severe, obstructive sleep apnea (KARRI) (G47.33),  by overall AHI (apnea hypopnea index). This study was technically adequate to allow for interpretation.  CLINICAL HISTORY: 74 year old male presented with: 15 inch neck, BMI of 25.1, an Greene sleepiness score of 16, history of  hypertension, heart disease, a previous diagnosis of KARRI, daily use of supplemental oxygen and symptoms of nocturnal  witnessed apneas. Based on the clinical history, the patient has a high pre-test probability of having Moderate KARRI.  SLEEP STUDY FINDINGS: Patient underwent a 1 night Home Sleep Test and by behavioral criteria, slept for approximately  3.97 hours, with a sleep latency of 18 minutes and a sleep efficiency of 97%. Severe sleep disordered breathing (AHI=33) is  noted based on a 4% hypopnea desaturation criteria, entirely in the supine position (33 events/hour). The patient slept  supine 100% of the night based on valid recording time of 2.36 hours. The apneas/hypopneas are accompanied by minimal  oxygen desaturation (percent time below 90% SpO2: 4%, Min SpO2: 60.6%, Baseline SpO2: 98%). The average desaturation  across all sleep disordered breathing events is 6%. The mean pulse rate is 58.3 BPM, with frequent pulse rate variability (43  events with >= 6 BPM increase/decrease per hour).  TREATMENT CONSIDERATIONS: Consider trial of Auto-titrating CPAP 6-20 cm, mask of patient's choice, and heated  humidification. If patient has difficulty with CPAP adherence or ongoing KARRI symptoms or despite CPAP adherence, then  consider an in-lab titration sleep study in order to determine optimal fixed CPAP  setting.  DISEASE MANAGEMENT CONSIDERATIONS: Definitive treatment for KARRI is recommended. Consider Sleep Clinic referral for  KARRI management.  Signature: Date: 06- 13:32:01 EST

## 2025-07-06 ENCOUNTER — PATIENT MESSAGE (OUTPATIENT)
Facility: CLINIC | Age: 75
End: 2025-07-06
Payer: MEDICARE

## 2025-07-09 ENCOUNTER — PATIENT MESSAGE (OUTPATIENT)
Dept: SLEEP MEDICINE | Facility: CLINIC | Age: 75
End: 2025-07-09
Payer: MEDICARE

## 2025-07-15 ENCOUNTER — DOCUMENT SCAN (OUTPATIENT)
Dept: HOME HEALTH SERVICES | Facility: HOSPITAL | Age: 75
End: 2025-07-15
Payer: MEDICARE

## 2025-07-16 ENCOUNTER — DOCUMENT SCAN (OUTPATIENT)
Dept: HOME HEALTH SERVICES | Facility: HOSPITAL | Age: 75
End: 2025-07-16
Payer: MEDICARE

## 2025-07-22 ENCOUNTER — PATIENT MESSAGE (OUTPATIENT)
Facility: CLINIC | Age: 75
End: 2025-07-22
Payer: MEDICARE

## 2025-07-22 DIAGNOSIS — M48.062 LUMBAR STENOSIS WITH NEUROGENIC CLAUDICATION: ICD-10-CM

## 2025-07-22 DIAGNOSIS — G89.29 OTHER CHRONIC PAIN: ICD-10-CM

## 2025-07-22 DIAGNOSIS — G20.A1 PARKINSON'S DISEASE WITHOUT DYSKINESIA OR FLUCTUATING MANIFESTATIONS: Primary | ICD-10-CM

## 2025-07-29 ENCOUNTER — PROCEDURE VISIT (OUTPATIENT)
Facility: CLINIC | Age: 75
End: 2025-07-29
Payer: MEDICARE

## 2025-07-29 DIAGNOSIS — G60.3 IDIOPATHIC PROGRESSIVE NEUROPATHY: ICD-10-CM

## 2025-07-29 PROCEDURE — 95885 MUSC TST DONE W/NERV TST LIM: CPT | Mod: PBBFAC | Performed by: PSYCHIATRY & NEUROLOGY

## 2025-07-29 PROCEDURE — 95910 NRV CNDJ TEST 7-8 STUDIES: CPT | Mod: PBBFAC | Performed by: PSYCHIATRY & NEUROLOGY

## 2025-07-30 NOTE — PROCEDURES
Department of Neurology  Phone No: [phone], Fax: [phone]    Neurography & Electromyography Report        Full Name: Ulisses Ponce Gender: Male  Patient ID: [id number] YOB: 1950      Visit Date: 7/29/2025 10:22 AM  Age: 74 Years  Examining Physician: Clementina Dubois MD, FAAN  Referring Physician: REYMUNDO Tucker PA-C  Technician: JOAN Jhaveri   Assisting Technician: Lazarus Reagan RNCST   Height: 5 feet 9 inch  Weight: 170 lbs        Ulisses Ponce [id number] 7/29/2025 10:22 AM     1 of 1  Reason for Referral:  Numbness and tingling of the lower extremities    Relevant medical diagnoses:  Parkinson's Disease    Surgical procedures:  Previous cervical and lumbar spine surgeries        Ulisses Ponce [id number] 7/29/2025 10:22 AM     1 of 1    History and Examination:    74-yr-old man with P.D., previous spinal surgeries (cervical, lumbar), and recent hospitalizations for cellulitis.  Legs show healing sores.  Pt indicates pain in the sacral area--there is a small decubitus ulcer which appears superficial.  He has had chronic skin infections from debility.  He requires full assistance with transfers on/off exam table.  He arrived in a wheelchair.     Summary:     Nerve conduction studies were performed on the bilateral sural, peroneal, and tibial nerves.  The right radial sensory nerve was examined to assess for a generalized polyneuropathy.  No responses were obtained for the sural nerves.  Amplitudes were reduced for the motor nerves.  Normal responses were obtained for the right radial sensory nerve.    EMG was performed on select muscles of the lower extremities.  The paraspinals were not examined due to his previous spinal surgery.  There was increased insertional activity and decreased recruitment of large amplitude motor unit potentials in the L4-5, L5-S1 myotomes.  Active denervation was not observed.    Interpretation:        Sensorimotor polyneuropathy of the lower extremities    Chronic L4-5, L5-S1 radiculopathy        Clementina Dubois MD, FAAN  Neuromuscular Consultant  [facility]    Ulisses Ponce [id number] 7/29/2025 10:22 AM     1 of 1               Sensory NCS      Nerve / Sites Rec. Site Segments Onset Lat Peak Lat Onset George Temp. Amp Distance      ms ms m/s °C µV mm   R Radial - Superficial (Antidromic)      Forearm Wrist Forearm - Wrist 1.72 2.55 58.2 33.8 31.1 100      Ref.  Ref. <=2.20 <=2.80   >=7.0    R Sural - (Antidromic)      Calf Ankle Calf - Ankle NR NR NR 31.6       Ref.  Ref. <=3.60 <=4.50   >=4.0    L Sural - (Antidromic)      Calf Ankle Calf - Ankle NR NR NR 32.1       Ref.  Ref. <=3.60 <=4.50   >=4.0        Motor NCS      Nerve / Sites Muscle Segments Latency Ref. Velocity Ref. Amplitude Ref. Temp. Dur. Distance      ms ms m/s m/s mV mV °C ms mm   R Peroneal - EDB      Ankle EDB Ankle - EDB 4.85 <=6.50   1.4 >=1.1 31.6 5.1 70      B. Fib Head EDB B. Fib Head - Ankle 11.75  45 >=36 1.3  31.7 5.5 310      A. Fib Head EDB A. Fib Head - B. Fib Head 13.73  51 >=42 1.1  31.7 5.0 100   L Peroneal - EDB      Ankle EDB Ankle - EDB 5.79 <=6.50   0.9 >=1.1 32.1 6.8 70      B. Fib Head EDB B. Fib Head - Ankle 12.23  50 >=36 0.7  32.2 7.0 320      A. Fib Head EDB A. Fib Head - B. Fib Head 13.98  57 >=42 0.6  32.2 4.7 100   R Tibial - AH      Ankle AH Ankle - AH 4.38 <=6.10   1.7 >=1.1 31.7 5.5 80   L Tibial - AH      Ankle AH Ankle - AH 3.90 <=6.10   6.0 >=1.1 32.1 5.2 80       F  Wave      Nerve F Latency Ref. M Latency F - M Lat    ms ms ms ms   R Peroneal - EDB 31.1 <=63.6 5.5 25.6   R Tibial - AH 52.2 <=61.1 5.2 47.0   L Peroneal - EDB 50.4 <=63.6 5.2 45.2   L Tibial - AH 51.3 <=61.1 51.3 0.0       H Reflex      Nerve H Latency Ref.    ms ms   R Tibial - Soleus 40.0 <=35.0   L Tibial - Soleus 40.1 <=35.0       EMG Summary Table     Spontaneous Recruitment MUAP   Muscle Nerve Roots IA Fib PSW Fasc Other Pattern Amp Dur. PPP   L. and R, Tibialis anterior  Deep peroneal (Fibular) L4-L5 Inc None None None N N Inc N 1+   L. Gastrocnemius (Lateral head) Tibial S1-S2 Inc None None None N N Inc N 1+   R. Gastrocnemius (Lateral head) Tibial S1-S2 Inc None None None N N Inc N 1+   L. Vastus lateralis Femoral L2-L4 N None None None N N N N N   R. Vastus lateralis Femoral L2-L4 N None None None N N N N N

## 2025-07-31 ENCOUNTER — TELEPHONE (OUTPATIENT)
Facility: CLINIC | Age: 75
End: 2025-07-31
Payer: MEDICARE

## 2025-07-31 NOTE — TELEPHONE ENCOUNTER
Copied from CRM #[id number]. Topic: General Inquiry - Return Call  >> Jul 30, 2025  4:53 PM Urvashi wrote:  Type:  Patient Returning Call        Who Called:Yasmeen (Wife)        Who Left Message for Patient: JUDI Tucker        Does the patient know what this is regarding? Yasmeen returned call states it was in regards to EMG results. Please advise         Best Call Back Number:Telephone Information:  Mobile          [phone]   7

## 2025-08-19 ENCOUNTER — PATIENT MESSAGE (OUTPATIENT)
Facility: CLINIC | Age: 75
End: 2025-08-19
Payer: MEDICARE

## 2025-08-27 DIAGNOSIS — G20.A1 PARKINSON'S DISEASE WITHOUT DYSKINESIA OR FLUCTUATING MANIFESTATIONS: ICD-10-CM

## 2025-08-29 ENCOUNTER — PATIENT MESSAGE (OUTPATIENT)
Facility: CLINIC | Age: 75
End: 2025-08-29
Payer: MEDICARE

## 2025-08-29 RX ORDER — ISTRADEFYLLINE 20 MG/1
1 TABLET, FILM COATED ORAL DAILY
Qty: 30 TABLET | Refills: 11 | Status: ACTIVE | OUTPATIENT
Start: 2025-08-29

## 2025-09-02 RX ORDER — RIVASTIGMINE 4.6 MG/24H
1 PATCH, EXTENDED RELEASE TRANSDERMAL DAILY
Qty: 30 PATCH | Refills: 11 | Status: SHIPPED | OUTPATIENT
Start: 2025-09-02 | End: 2026-09-02